# Patient Record
Sex: MALE | Race: WHITE | NOT HISPANIC OR LATINO | URBAN - METROPOLITAN AREA
[De-identification: names, ages, dates, MRNs, and addresses within clinical notes are randomized per-mention and may not be internally consistent; named-entity substitution may affect disease eponyms.]

---

## 2019-08-05 ENCOUNTER — INPATIENT (INPATIENT)
Facility: HOSPITAL | Age: 54
LOS: 3 days | Discharge: HOME | DRG: 378 | End: 2019-08-09
Attending: GENERAL PRACTICE | Admitting: GENERAL PRACTICE
Payer: MEDICAID

## 2019-08-05 VITALS
TEMPERATURE: 98 F | DIASTOLIC BLOOD PRESSURE: 103 MMHG | HEART RATE: 101 BPM | WEIGHT: 179.9 LBS | SYSTOLIC BLOOD PRESSURE: 178 MMHG | OXYGEN SATURATION: 99 % | RESPIRATION RATE: 20 BRPM | HEIGHT: 67 IN

## 2019-08-05 DIAGNOSIS — R73.9 HYPERGLYCEMIA, UNSPECIFIED: ICD-10-CM

## 2019-08-05 DIAGNOSIS — K92.0 HEMATEMESIS: ICD-10-CM

## 2019-08-05 DIAGNOSIS — K80.20 CALCULUS OF GALLBLADDER WITHOUT CHOLECYSTITIS WITHOUT OBSTRUCTION: ICD-10-CM

## 2019-08-05 DIAGNOSIS — F10.239 ALCOHOL DEPENDENCE WITH WITHDRAWAL, UNSPECIFIED: ICD-10-CM

## 2019-08-05 DIAGNOSIS — Z29.9 ENCOUNTER FOR PROPHYLACTIC MEASURES, UNSPECIFIED: ICD-10-CM

## 2019-08-05 LAB
ACETONE SERPL-MCNC: ABNORMAL
APPEARANCE UR: CLEAR — SIGNIFICANT CHANGE UP
APTT BLD: 28.4 SEC — SIGNIFICANT CHANGE UP (ref 27.5–36.3)
BASE EXCESS BLDV CALC-SCNC: 5.8 MMOL/L — HIGH (ref -2–2)
BASOPHILS # BLD AUTO: 0.04 K/UL — SIGNIFICANT CHANGE UP (ref 0–0.2)
BASOPHILS NFR BLD AUTO: 0.2 % — SIGNIFICANT CHANGE UP (ref 0–2)
BILIRUB UR-MCNC: NEGATIVE — SIGNIFICANT CHANGE UP
COLOR SPEC: YELLOW — SIGNIFICANT CHANGE UP
DIFF PNL FLD: ABNORMAL
EOSINOPHIL # BLD AUTO: 0.01 K/UL — SIGNIFICANT CHANGE UP (ref 0–0.5)
EOSINOPHIL NFR BLD AUTO: 0.1 % — SIGNIFICANT CHANGE UP (ref 0–6)
GLUCOSE BLDC GLUCOMTR-MCNC: 288 MG/DL — HIGH (ref 70–99)
GLUCOSE UR QL: 1000 MG/DL
HCO3 BLDV-SCNC: 27 MMOL/L — SIGNIFICANT CHANGE UP (ref 21–29)
HCT VFR BLD CALC: 41.1 % — SIGNIFICANT CHANGE UP (ref 39–50)
HCT VFR BLD CALC: 48.5 % — SIGNIFICANT CHANGE UP (ref 39–50)
HGB BLD-MCNC: 13.3 G/DL — SIGNIFICANT CHANGE UP (ref 13–17)
HGB BLD-MCNC: 16.2 G/DL — SIGNIFICANT CHANGE UP (ref 13–17)
HOROWITZ INDEX BLDV+IHG-RTO: 21 — SIGNIFICANT CHANGE UP
IMM GRANULOCYTES NFR BLD AUTO: 0.5 % — SIGNIFICANT CHANGE UP (ref 0–1.5)
INR BLD: 1.06 RATIO — SIGNIFICANT CHANGE UP (ref 0.88–1.16)
KETONES UR-MCNC: ABNORMAL
LACTATE SERPL-SCNC: 1.1 MMOL/L — SIGNIFICANT CHANGE UP (ref 0.7–2)
LACTATE SERPL-SCNC: 3 MMOL/L — HIGH (ref 0.7–2)
LEUKOCYTE ESTERASE UR-ACNC: NEGATIVE — SIGNIFICANT CHANGE UP
LYMPHOCYTES # BLD AUTO: 1.27 K/UL — SIGNIFICANT CHANGE UP (ref 1–3.3)
LYMPHOCYTES # BLD AUTO: 7.7 % — LOW (ref 13–44)
MAGNESIUM SERPL-MCNC: 2.1 MG/DL — SIGNIFICANT CHANGE UP (ref 1.6–2.6)
MCHC RBC-ENTMCNC: 29.7 PG — SIGNIFICANT CHANGE UP (ref 27–34)
MCHC RBC-ENTMCNC: 30.2 PG — SIGNIFICANT CHANGE UP (ref 27–34)
MCHC RBC-ENTMCNC: 32.4 GM/DL — SIGNIFICANT CHANGE UP (ref 32–36)
MCHC RBC-ENTMCNC: 33.4 GM/DL — SIGNIFICANT CHANGE UP (ref 32–36)
MCV RBC AUTO: 90.5 FL — SIGNIFICANT CHANGE UP (ref 80–100)
MCV RBC AUTO: 91.7 FL — SIGNIFICANT CHANGE UP (ref 80–100)
MONOCYTES # BLD AUTO: 0.5 K/UL — SIGNIFICANT CHANGE UP (ref 0–0.9)
MONOCYTES NFR BLD AUTO: 3 % — SIGNIFICANT CHANGE UP (ref 2–14)
NEUTROPHILS # BLD AUTO: 14.62 K/UL — HIGH (ref 1.8–7.4)
NEUTROPHILS NFR BLD AUTO: 88.5 % — HIGH (ref 43–77)
NITRITE UR-MCNC: NEGATIVE — SIGNIFICANT CHANGE UP
NRBC # BLD: 0 /100 WBCS — SIGNIFICANT CHANGE UP (ref 0–0)
NRBC # BLD: 0 /100 WBCS — SIGNIFICANT CHANGE UP (ref 0–0)
PCO2 BLDV: 31 MMHG — LOW (ref 35–50)
PH BLDV: 7.56 — HIGH (ref 7.35–7.45)
PH UR: 6 — SIGNIFICANT CHANGE UP (ref 5–8)
PLATELET # BLD AUTO: 180 K/UL — SIGNIFICANT CHANGE UP (ref 150–400)
PLATELET # BLD AUTO: 201 K/UL — SIGNIFICANT CHANGE UP (ref 150–400)
PO2 BLDV: 28 MMHG — SIGNIFICANT CHANGE UP (ref 25–45)
PROT UR-MCNC: 500 MG/DL
PROTHROM AB SERPL-ACNC: 11.8 SEC — SIGNIFICANT CHANGE UP (ref 10–12.9)
RBC # BLD: 4.48 M/UL — SIGNIFICANT CHANGE UP (ref 4.2–5.8)
RBC # BLD: 5.36 M/UL — SIGNIFICANT CHANGE UP (ref 4.2–5.8)
RBC # FLD: 12.8 % — SIGNIFICANT CHANGE UP (ref 10.3–14.5)
RBC # FLD: 12.8 % — SIGNIFICANT CHANGE UP (ref 10.3–14.5)
SAO2 % BLDV: 62 % — LOW (ref 67–88)
SP GR SPEC: 1.01 — SIGNIFICANT CHANGE UP (ref 1.01–1.02)
UROBILINOGEN FLD QL: NEGATIVE — SIGNIFICANT CHANGE UP
WBC # BLD: 14.17 K/UL — HIGH (ref 3.8–10.5)
WBC # BLD: 16.53 K/UL — HIGH (ref 3.8–10.5)
WBC # FLD AUTO: 14.17 K/UL — HIGH (ref 3.8–10.5)
WBC # FLD AUTO: 16.53 K/UL — HIGH (ref 3.8–10.5)

## 2019-08-05 PROCEDURE — 74174 CTA ABD&PLVS W/CONTRAST: CPT | Mod: 26

## 2019-08-05 PROCEDURE — 99285 EMERGENCY DEPT VISIT HI MDM: CPT

## 2019-08-05 RX ORDER — ONDANSETRON 8 MG/1
4 TABLET, FILM COATED ORAL ONCE
Refills: 0 | Status: COMPLETED | OUTPATIENT
Start: 2019-08-05 | End: 2019-08-05

## 2019-08-05 RX ORDER — INSULIN HUMAN 100 [IU]/ML
10 INJECTION, SOLUTION SUBCUTANEOUS ONCE
Refills: 0 | Status: COMPLETED | OUTPATIENT
Start: 2019-08-05 | End: 2019-08-05

## 2019-08-05 RX ORDER — ACETAMINOPHEN 500 MG
1000 TABLET ORAL ONCE
Refills: 0 | Status: COMPLETED | OUTPATIENT
Start: 2019-08-05 | End: 2019-08-05

## 2019-08-05 RX ORDER — INSULIN LISPRO 100/ML
VIAL (ML) SUBCUTANEOUS
Refills: 0 | Status: DISCONTINUED | OUTPATIENT
Start: 2019-08-05 | End: 2019-08-09

## 2019-08-05 RX ORDER — SODIUM CHLORIDE 9 MG/ML
1000 INJECTION, SOLUTION INTRAVENOUS
Refills: 0 | Status: DISCONTINUED | OUTPATIENT
Start: 2019-08-05 | End: 2019-08-06

## 2019-08-05 RX ORDER — AMLODIPINE BESYLATE 2.5 MG/1
5 TABLET ORAL ONCE
Refills: 0 | Status: COMPLETED | OUTPATIENT
Start: 2019-08-05 | End: 2019-08-05

## 2019-08-05 RX ORDER — THIAMINE MONONITRATE (VIT B1) 100 MG
100 TABLET ORAL ONCE
Refills: 0 | Status: COMPLETED | OUTPATIENT
Start: 2019-08-05 | End: 2019-08-05

## 2019-08-05 RX ORDER — FOLIC ACID 0.8 MG
1 TABLET ORAL DAILY
Refills: 0 | Status: DISCONTINUED | OUTPATIENT
Start: 2019-08-05 | End: 2019-08-09

## 2019-08-05 RX ORDER — INSULIN GLARGINE 100 [IU]/ML
20 INJECTION, SOLUTION SUBCUTANEOUS AT BEDTIME
Refills: 0 | Status: DISCONTINUED | OUTPATIENT
Start: 2019-08-05 | End: 2019-08-06

## 2019-08-05 RX ORDER — HYDROMORPHONE HYDROCHLORIDE 2 MG/ML
1 INJECTION INTRAMUSCULAR; INTRAVENOUS; SUBCUTANEOUS ONCE
Refills: 0 | Status: DISCONTINUED | OUTPATIENT
Start: 2019-08-05 | End: 2019-08-05

## 2019-08-05 RX ORDER — PANTOPRAZOLE SODIUM 20 MG/1
40 TABLET, DELAYED RELEASE ORAL ONCE
Refills: 0 | Status: COMPLETED | OUTPATIENT
Start: 2019-08-05 | End: 2019-08-05

## 2019-08-05 RX ORDER — THIAMINE MONONITRATE (VIT B1) 100 MG
100 TABLET ORAL DAILY
Refills: 0 | Status: COMPLETED | OUTPATIENT
Start: 2019-08-05 | End: 2019-08-08

## 2019-08-05 RX ORDER — ONDANSETRON 8 MG/1
4 TABLET, FILM COATED ORAL ONCE
Refills: 0 | Status: DISCONTINUED | OUTPATIENT
Start: 2019-08-05 | End: 2019-08-05

## 2019-08-05 RX ORDER — ONDANSETRON 8 MG/1
4 TABLET, FILM COATED ORAL EVERY 8 HOURS
Refills: 0 | Status: DISCONTINUED | OUTPATIENT
Start: 2019-08-05 | End: 2019-08-09

## 2019-08-05 RX ORDER — PANTOPRAZOLE SODIUM 20 MG/1
40 TABLET, DELAYED RELEASE ORAL EVERY 12 HOURS
Refills: 0 | Status: DISCONTINUED | OUTPATIENT
Start: 2019-08-05 | End: 2019-08-09

## 2019-08-05 RX ORDER — BENZOCAINE AND MENTHOL 5; 1 G/100ML; G/100ML
1 LIQUID ORAL THREE TIMES A DAY
Refills: 0 | Status: DISCONTINUED | OUTPATIENT
Start: 2019-08-05 | End: 2019-08-09

## 2019-08-05 RX ORDER — SODIUM CHLORIDE 9 MG/ML
1000 INJECTION INTRAMUSCULAR; INTRAVENOUS; SUBCUTANEOUS ONCE
Refills: 0 | Status: COMPLETED | OUTPATIENT
Start: 2019-08-05 | End: 2019-08-05

## 2019-08-05 RX ADMIN — BENZOCAINE AND MENTHOL 1 LOZENGE: 5; 1 LIQUID ORAL at 19:11

## 2019-08-05 RX ADMIN — BENZOCAINE AND MENTHOL 1 LOZENGE: 5; 1 LIQUID ORAL at 22:10

## 2019-08-05 RX ADMIN — Medication 1000 MILLIGRAM(S): at 19:08

## 2019-08-05 RX ADMIN — AMLODIPINE BESYLATE 5 MILLIGRAM(S): 2.5 TABLET ORAL at 19:10

## 2019-08-05 RX ADMIN — SODIUM CHLORIDE 1000 MILLILITER(S): 9 INJECTION INTRAMUSCULAR; INTRAVENOUS; SUBCUTANEOUS at 11:30

## 2019-08-05 RX ADMIN — HYDROMORPHONE HYDROCHLORIDE 1 MILLIGRAM(S): 2 INJECTION INTRAMUSCULAR; INTRAVENOUS; SUBCUTANEOUS at 08:12

## 2019-08-05 RX ADMIN — SODIUM CHLORIDE 1000 MILLILITER(S): 9 INJECTION INTRAMUSCULAR; INTRAVENOUS; SUBCUTANEOUS at 08:12

## 2019-08-05 RX ADMIN — SODIUM CHLORIDE 1000 MILLILITER(S): 9 INJECTION INTRAMUSCULAR; INTRAVENOUS; SUBCUTANEOUS at 10:13

## 2019-08-05 RX ADMIN — SODIUM CHLORIDE 100 MILLILITER(S): 9 INJECTION, SOLUTION INTRAVENOUS at 19:10

## 2019-08-05 RX ADMIN — Medication 100 MILLIGRAM(S): at 22:10

## 2019-08-05 RX ADMIN — ONDANSETRON 4 MILLIGRAM(S): 8 TABLET, FILM COATED ORAL at 23:55

## 2019-08-05 RX ADMIN — Medication 3: at 22:09

## 2019-08-05 RX ADMIN — Medication 30 MILLILITER(S): at 17:23

## 2019-08-05 RX ADMIN — Medication 2 MILLIGRAM(S): at 17:52

## 2019-08-05 RX ADMIN — ONDANSETRON 4 MILLIGRAM(S): 8 TABLET, FILM COATED ORAL at 17:52

## 2019-08-05 RX ADMIN — PANTOPRAZOLE SODIUM 40 MILLIGRAM(S): 20 TABLET, DELAYED RELEASE ORAL at 08:11

## 2019-08-05 RX ADMIN — Medication 400 MILLIGRAM(S): at 17:52

## 2019-08-05 RX ADMIN — ONDANSETRON 4 MILLIGRAM(S): 8 TABLET, FILM COATED ORAL at 08:11

## 2019-08-05 RX ADMIN — HYDROMORPHONE HYDROCHLORIDE 1 MILLIGRAM(S): 2 INJECTION INTRAMUSCULAR; INTRAVENOUS; SUBCUTANEOUS at 09:30

## 2019-08-05 RX ADMIN — INSULIN GLARGINE 20 UNIT(S): 100 INJECTION, SOLUTION SUBCUTANEOUS at 22:09

## 2019-08-05 NOTE — H&P ADULT - PROBLEM SELECTOR PLAN 4
pt has been drinking 1-2 drinks per day for last 2 months.  last drink was 3 days ago.  [place] monitoring

## 2019-08-05 NOTE — ED ADULT NURSE NOTE - ED STAT RN HANDOFF DETAILS 2
Pt endorsed to [facility] in no distress, awaiting transfer to bed on [address]. Report given to Susanna LIM by Aneta LIM.

## 2019-08-05 NOTE — ED PROVIDER NOTE - CARE PLAN
Principal Discharge DX:	Hematemesis  Secondary Diagnosis:	Hyperglycemia  Secondary Diagnosis:	Generalized abdominal pain  Secondary Diagnosis:	QT prolongation

## 2019-08-05 NOTE — H&P ADULT - PROBLEM SELECTOR PLAN 2
pt is found to have cholelithiases on CT abdomen.  Generalized abdominal tenderness on examination.  nausea and vomiting.  WBC count of 15K.  GI consult. pt is found to have cholelithiases on CT abdomen.  Generalized abdominal tenderness on examination.  nausea and vomiting.  WBC count of 15K.  GI consult. dr reno pt is found to have cholelithiases on CT abdomen.  Generalized abdominal tenderness on examination.  nausea and vomiting.  WBC count of 15K.  GI consult. dr reno  ? need for further testing / HIDA ?

## 2019-08-05 NOTE — H&P ADULT - NEUROLOGICAL DETAILS
Referred To Otolaryngology For Closure Text (Leave Blank If You Do Not Want): After obtaining clear surgical margins the patient was sent to otolaryngology for surgical repair.  The patient understands they will receive post-surgical care and follow-up from the referring physician's office. normal strength/alert and oriented x 3/sensation intact

## 2019-08-05 NOTE — ED PROVIDER NOTE - OBJECTIVE STATEMENT
I assisted with Georgian translation, declines formal translation.  53yoM with h/o DM on insulin, presents with generalized abdominal pain, severe, since Saturday, pt not able to characterize further 2/2 pain. Associated multiple episodes of emesis, marhilario noted here.  last BM was 2 days ago.  was at another hospital 2 days ago for the same, unable to tell me dx at that time.  has been drinking a lot of EtOH x 2 months. Denies past surgical hx or other PMHx.

## 2019-08-05 NOTE — H&P ADULT - NSHPREVIEWOFSYSTEMS_GEN_ALL_CORE
GEN: no fever, no chills, some epigastric pain  RESP: no SOB, no cough, no sputum  CVS: no chest pain, no palpitations, no edema  GI: some epigastric abdominal and esophageal pain, no nausea, no vomiting ( last one a few hours ago   : no dysuria, no frequency, no hematuria  NEURO: no headache, no dizziness  PSYCH: no depression, not anxious  Derm : no itching, no rash

## 2019-08-05 NOTE — H&P ADULT - ASSESSMENT
53 years old male from home with PMHx of diabetes (on insulin), alcoholism (1-2 drinks daily for last 2 months) presented to ED with complain of blood in vomitus. Patient is having severe generalized abdominal pain which is intermittent and associated with nausea and multiple episodes of vomiting. Patient had few episodes of streaks of blood along with vomiting (maroon color vomitus). He is regular alcohol drinker with his last drink about 3 days ago. patient also complains of throat dryness and pain along with acid reflux burning sensation which is relieved by taking Maalox Patient never had this kind of abdominal pain before. Patient denies any subjective fever, eating from outside or any body having similar symptoms at home. Patient is not on any blood thinner.  patient denies any chest pain, shortness of breath, palpitations, diarrhea, constipation, urinary frequency, relation of pain to food intake or any other symptom.

## 2019-08-05 NOTE — ED PROVIDER NOTE - CLINICAL SUMMARY MEDICAL DECISION MAKING FREE TEXT BOX
Character low suspicion for ACS and negative ECG. Abdominal exam benign on initial and repeat exams with low suspicion for acute process, and no e/o this on CT. Noted gallstones, no Evans's or LFT/bili elevation with low suspicion for this as cause of symptoms. No e/o mesenteric ischemia and low suspicion for this. No e/o SBO or pancreatitis. Pt with hyperglycemia without acidosis, ketones likely 2/2 dehydration. Given fluids, Dilaudid, Zofran with control of his symptoms however does not feel comfortable with discharge and has no established care in the [place]. Patient hemodynamically stable. Admitted to internal medicine for further monitoring, w/u, and care of concern for upper GI bleed with mild maroon hematemesis.

## 2019-08-05 NOTE — H&P ADULT - PROBLEM SELECTOR PLAN 3
pt presented with hyperglycemia. no anion gap but acetone was high.  pt has history of DM for last 11 years and takes variable number of lantus units based on his blood sugar levels. on average 24 units.  pt is started on lantus 20 units and insulin HSS  f/u hba1c pt presented with hyperglycemia / diabetic ketosis without acidosis      no anion gap but acetone was high.  pt has history of DM for last 11 years and takes variable number of lantus units based on his blood sugar levels. on average 24 units.  pt is started on lantus 20 units and insulin HSS  f/u hba1c

## 2019-08-05 NOTE — H&P ADULT - HISTORY OF PRESENT ILLNESS
53 years old male from home with PMHx of diabetes (on insulin), alcoholism (1-2 drinks daily for last 2 months) presented to ED with complain of blood in vomitus. Patient is having severe generalized abdominal pain which is intermittent and associated with nausea and multiple episodes of vomiting. Patient had few episodes of streaks of blood along with vomiting. He is regular alcohol drinker with his last drink about 3 days ago. patient also complains of throat dryness and pain along with acid reflux burning sensation which is relieved by taking Maalox Patient never had this kind of abdominal pain before. Patient denies any subjective fever, eating from outside or any body having similar symptoms at home. Patient is not on any blood thinner.  patient denies any chest pain, shortness of breath, palpitations, diarrhea, constipation, urinary frequency, relation of pain to food intake or any other symptom. 53 years old male from home with PMHx of diabetes (on insulin), alcoholism (1-2 drinks daily for last 2 months) presented to ED with complain of blood in vomitus. Patient is having severe generalized abdominal pain which is intermittent and associated with nausea and multiple episodes of vomiting. Patient had few episodes of streaks of blood along with vomiting.   He is regular alcohol drinker with his last drink about 3 days ago. patient also complains of throat dryness and pain along with acid reflux burning sensation which is relieved by taking Maalox Patient never had this kind of abdominal pain before. Patient denies any subjective fever, eating from outside or any body having similar symptoms at home. Patient is not on any blood thinner.  patient denies any chest pain, shortness of breath, palpitations, diarrhea, constipation, urinary frequency, relation of pain to food intake or any other symptom.

## 2019-08-05 NOTE — ED ADULT NURSE NOTE - NSIMPLEMENTINTERV_GEN_ALL_ED
Implemented All Fall with Harm Risk Interventions:  [place] to call system. Call bell, personal items and telephone within reach. Instruct patient to call for assistance. Room bathroom lighting operational. Non-slip footwear when patient is off stretcher. Physically safe environment: no spills, clutter or unnecessary equipment. Stretcher in lowest position, wheels locked, appropriate side rails in place. Provide visual cue, wrist band, yellow gown, etc. Monitor gait and stability. Monitor for mental status changes and reorient to person, place, and time. Review medications for side effects contributing to fall risk. Reinforce activity limits and safety measures with patient and family. Provide visual clues: red socks.

## 2019-08-05 NOTE — ED PROVIDER NOTE - PHYSICAL EXAMINATION
Afebrile, hemodynamically stable, saturating well  Moaning, anxious, maroon emesis in basin nearby  Head NCAT  EOMI grossly, anicteric  MM dry  RRR, nml S1/S2, no m/r/g  Lungs CTAB, no w/r/r  Abd soft, diffusely TTP with no rebound or guarding, ND  AAO, CN's 3-12 grossly intact  KEANE spontaneously, no leg cyanosis or edema  Skin warm, dry, no rashes or hives

## 2019-08-05 NOTE — H&P ADULT - NSHPPHYSICALEXAM_GEN_ALL_CORE
ICU Vital Signs Last 24 Hrs  T(C): 36.6 (05 Aug 2019 15:29), Max: 36.6 (05 Aug 2019 07:15)  T(F): 97.8 (05 Aug 2019 15:29), Max: 97.8 (05 Aug 2019 07:15)  HR: 96 (05 Aug 2019 15:29) (91 - 101)  BP: 154/62 (05 Aug 2019 15:29) (154/62 - 178/103)  BP(mean): --  ABP: --  ABP(mean): --  RR: 20 (05 Aug 2019 15:29) (20 - 20)  SpO2: 100% (05 Aug 2019 15:29) (99% - 100%)                                  Head NCAT  	EOMI grossly, anicteric  	MM dry  	RRR, nml S1/S2, no m/r/g  	Lungs CTAB, no w/r/r  	Abd soft, diffusely TTP with no rebound or guarding, ND  	AAO, CN's 3-12 grossly intact  	KEANE spontaneously, no leg cyanosis or edema                 Skin warm, dry, no rashes or hives

## 2019-08-06 LAB
ALBUMIN SERPL ELPH-MCNC: 2.6 G/DL — LOW (ref 3.5–5)
ALP SERPL-CCNC: 103 U/L — SIGNIFICANT CHANGE UP (ref 40–120)
ALT FLD-CCNC: 20 U/L DA — SIGNIFICANT CHANGE UP (ref 10–60)
ANION GAP SERPL CALC-SCNC: 9 MMOL/L — SIGNIFICANT CHANGE UP (ref 5–17)
AST SERPL-CCNC: 18 U/L — SIGNIFICANT CHANGE UP (ref 10–40)
BASOPHILS # BLD AUTO: 0.03 K/UL — SIGNIFICANT CHANGE UP (ref 0–0.2)
BASOPHILS NFR BLD AUTO: 0.3 % — SIGNIFICANT CHANGE UP (ref 0–2)
BILIRUB SERPL-MCNC: 0.5 MG/DL — SIGNIFICANT CHANGE UP (ref 0.2–1.2)
BUN SERPL-MCNC: 29 MG/DL — HIGH (ref 7–18)
CALCIUM SERPL-MCNC: 8.7 MG/DL — SIGNIFICANT CHANGE UP (ref 8.4–10.5)
CHLORIDE SERPL-SCNC: 109 MMOL/L — HIGH (ref 96–108)
CHOLEST SERPL-MCNC: 183 MG/DL — SIGNIFICANT CHANGE UP (ref 10–199)
CO2 SERPL-SCNC: 27 MMOL/L — SIGNIFICANT CHANGE UP (ref 22–31)
CREAT ?TM UR-MCNC: 99 MG/DL — SIGNIFICANT CHANGE UP
CREAT SERPL-MCNC: 1.25 MG/DL — SIGNIFICANT CHANGE UP (ref 0.5–1.3)
CREAT SERPL-MCNC: 1.29 MG/DL — SIGNIFICANT CHANGE UP (ref 0.5–1.3)
EOSINOPHIL # BLD AUTO: 0.07 K/UL — SIGNIFICANT CHANGE UP (ref 0–0.5)
EOSINOPHIL NFR BLD AUTO: 0.7 % — SIGNIFICANT CHANGE UP (ref 0–6)
GLUCOSE BLDC GLUCOMTR-MCNC: 177 MG/DL — HIGH (ref 70–99)
GLUCOSE BLDC GLUCOMTR-MCNC: 179 MG/DL — HIGH (ref 70–99)
GLUCOSE BLDC GLUCOMTR-MCNC: 198 MG/DL — HIGH (ref 70–99)
GLUCOSE BLDC GLUCOMTR-MCNC: 221 MG/DL — HIGH (ref 70–99)
GLUCOSE SERPL-MCNC: 226 MG/DL — HIGH (ref 70–99)
HBA1C BLD-MCNC: 9.5 % — HIGH (ref 4–5.6)
HCT VFR BLD CALC: 43.4 % — SIGNIFICANT CHANGE UP (ref 39–50)
HDLC SERPL-MCNC: 63 MG/DL — SIGNIFICANT CHANGE UP
HGB BLD-MCNC: 14.3 G/DL — SIGNIFICANT CHANGE UP (ref 13–17)
IMM GRANULOCYTES NFR BLD AUTO: 0.3 % — SIGNIFICANT CHANGE UP (ref 0–1.5)
LIPID PNL WITH DIRECT LDL SERPL: 100 MG/DL — SIGNIFICANT CHANGE UP
LYMPHOCYTES # BLD AUTO: 1.79 K/UL — SIGNIFICANT CHANGE UP (ref 1–3.3)
LYMPHOCYTES # BLD AUTO: 17.5 % — SIGNIFICANT CHANGE UP (ref 13–44)
MAGNESIUM SERPL-MCNC: 2.2 MG/DL — SIGNIFICANT CHANGE UP (ref 1.6–2.6)
MCHC RBC-ENTMCNC: 29.8 PG — SIGNIFICANT CHANGE UP (ref 27–34)
MCHC RBC-ENTMCNC: 32.9 GM/DL — SIGNIFICANT CHANGE UP (ref 32–36)
MCV RBC AUTO: 90.4 FL — SIGNIFICANT CHANGE UP (ref 80–100)
MONOCYTES # BLD AUTO: 0.63 K/UL — SIGNIFICANT CHANGE UP (ref 0–0.9)
MONOCYTES NFR BLD AUTO: 6.2 % — SIGNIFICANT CHANGE UP (ref 2–14)
NEUTROPHILS # BLD AUTO: 7.66 K/UL — HIGH (ref 1.8–7.4)
NEUTROPHILS NFR BLD AUTO: 75 % — SIGNIFICANT CHANGE UP (ref 43–77)
NRBC # BLD: 0 /100 WBCS — SIGNIFICANT CHANGE UP (ref 0–0)
OSMOLALITY SERPL: 318 MOSMOL/KG — HIGH (ref 275–300)
OSMOLALITY UR: 780 MOS/KG — SIGNIFICANT CHANGE UP (ref 50–1200)
PHOSPHATE SERPL-MCNC: 2 MG/DL — LOW (ref 2.5–4.5)
PLATELET # BLD AUTO: 200 K/UL — SIGNIFICANT CHANGE UP (ref 150–400)
POTASSIUM SERPL-MCNC: 3.6 MMOL/L — SIGNIFICANT CHANGE UP (ref 3.5–5.3)
POTASSIUM SERPL-SCNC: 3.6 MMOL/L — SIGNIFICANT CHANGE UP (ref 3.5–5.3)
PROT SERPL-MCNC: 7 G/DL — SIGNIFICANT CHANGE UP (ref 6–8.3)
RBC # BLD: 4.8 M/UL — SIGNIFICANT CHANGE UP (ref 4.2–5.8)
RBC # FLD: 12.8 % — SIGNIFICANT CHANGE UP (ref 10.3–14.5)
SODIUM SERPL-SCNC: 145 MMOL/L — SIGNIFICANT CHANGE UP (ref 135–145)
SODIUM UR-SCNC: 88 MMOL/L — SIGNIFICANT CHANGE UP (ref 40–220)
TOTAL CHOLESTEROL/HDL RATIO MEASUREMENT: 2.9 RATIO — LOW (ref 3.4–9.6)
TRIGL SERPL-MCNC: 100 MG/DL — SIGNIFICANT CHANGE UP (ref 10–149)
TSH SERPL-MCNC: 1.64 UU/ML — SIGNIFICANT CHANGE UP (ref 0.34–4.82)
VIT B12 SERPL-MCNC: 929 PG/ML — SIGNIFICANT CHANGE UP (ref 232–1245)
WBC # BLD: 10.21 K/UL — SIGNIFICANT CHANGE UP (ref 3.8–10.5)
WBC # FLD AUTO: 10.21 K/UL — SIGNIFICANT CHANGE UP (ref 3.8–10.5)

## 2019-08-06 RX ORDER — SODIUM CHLORIDE 9 MG/ML
1000 INJECTION, SOLUTION INTRAVENOUS
Refills: 0 | Status: DISCONTINUED | OUTPATIENT
Start: 2019-08-06 | End: 2019-08-08

## 2019-08-06 RX ORDER — INSULIN GLARGINE 100 [IU]/ML
24 INJECTION, SOLUTION SUBCUTANEOUS AT BEDTIME
Refills: 0 | Status: DISCONTINUED | OUTPATIENT
Start: 2019-08-06 | End: 2019-08-09

## 2019-08-06 RX ADMIN — INSULIN GLARGINE 24 UNIT(S): 100 INJECTION, SOLUTION SUBCUTANEOUS at 22:31

## 2019-08-06 RX ADMIN — Medication 1: at 22:31

## 2019-08-06 RX ADMIN — Medication 2: at 08:57

## 2019-08-06 RX ADMIN — BENZOCAINE AND MENTHOL 1 LOZENGE: 5; 1 LIQUID ORAL at 22:32

## 2019-08-06 RX ADMIN — ONDANSETRON 4 MILLIGRAM(S): 8 TABLET, FILM COATED ORAL at 05:26

## 2019-08-06 RX ADMIN — PANTOPRAZOLE SODIUM 40 MILLIGRAM(S): 20 TABLET, DELAYED RELEASE ORAL at 05:26

## 2019-08-06 RX ADMIN — SODIUM CHLORIDE 100 MILLILITER(S): 9 INJECTION, SOLUTION INTRAVENOUS at 06:23

## 2019-08-06 RX ADMIN — PANTOPRAZOLE SODIUM 40 MILLIGRAM(S): 20 TABLET, DELAYED RELEASE ORAL at 17:48

## 2019-08-06 RX ADMIN — BENZOCAINE AND MENTHOL 1 LOZENGE: 5; 1 LIQUID ORAL at 05:27

## 2019-08-06 RX ADMIN — ONDANSETRON 4 MILLIGRAM(S): 8 TABLET, FILM COATED ORAL at 17:45

## 2019-08-06 NOTE — PROGRESS NOTE ADULT - ASSESSMENT
_________________________________________________________________________________________  ========>>  M E D I C A L   A T T E N D I N G    F O L L O W  U P  N O T E  <<=========  -----------------------------------------------------------------------------------------------------    - Patient seen and examined by me approximately thirty minutes ago.  - In summary,  WU FARIAS is a 53y year old man who originally presented with abd pain, vomiting, ETOH   - Patient today overall doing ok, comfortable, still with pain in throat and abd, difficulty swallowing saliva     ==================>> REVIEW OF SYSTEM <<=================    GEN: no fever, no chills, pain as above   RESP: no SOB, no cough, no sputum  CVS: no chest pain, no palpitations, no edema  GI: + abdominal pain, sore throat , no nausea  : no dysuria, no frequency, no hematuria  Neuro: no headache, no dizziness  Derm : no itching, no rash    ==================>> PHYSICAL EXAM <<=================    GEN: A&O X 3 , NAD , comfortable in bed   HEENT: NCAT, PERRL, MMM, hearing intact  Neck: supple , no JVD  CVS: S1S2 , regular , No M/R/G appreciated  PULM: CTA B/L,  no W/R/R appreciated  ABD.: soft. non tender, non distended,  bowel sounds present  Extrem: intact pulses , no edema   PSYCH : normal mood,  not anxious      ==================>> MEDICATIONS <<====================    MEDICATIONS  (STANDING):  benzocaine 15 mG/menthol 3.6 mG (Sugar-Free) Lozenge 1 Lozenge Oral three times a day  folic acid 1 milliGRAM(s) Oral daily  insulin glargine Injectable (LANTUS) 24 Unit(s) SubCutaneous at bedtime  insulin lispro (HumaLOG) corrective regimen sliding scale   SubCutaneous Before meals and at bedtime  multivitamin 1 Tablet(s) Oral daily  ondansetron Injectable 4 milliGRAM(s) IV Push every 8 hours  pantoprazole  Injectable 40 milliGRAM(s) IV Push every 12 hours  sodium chloride 0.45% 1000 milliLiter(s) (100 mL/Hr) IV Continuous <Continuous>  thiamine 100 milliGRAM(s) Oral daily    MEDICATIONS  (PRN):  aluminum hydroxide/magnesium hydroxide/simethicone Suspension 30 milliLiter(s) Oral every 6 hours PRN Dyspepsia  aluminum hydroxide/magnesium hydroxide/simethicone Suspension 30 milliLiter(s) Oral every 6 hours PRN Dyspepsia  LORazepam   Injectable 2 milliGRAM(s) IV Push every 4 hours PRN CIWA-Ar score 8 or greater      ==================>> VITAL SIGNS <<==================    T(C): 37.1 (19 @ 10:55), Max: 37.3 (19 @ 19:35)  HR: 95 (19 @ 10:55) (86 - 98)  BP: 171/82 (19 @ 10:55) (154/62 - 171/82)  RR: 17 (19 @ 10:55) (17 - 20)  SpO2: 96% (19 @ 10:55) (95% - 100%)    POCT Blood Glucose.: 198 mg/dL (06 Aug 2019 12:06)  POCT Blood Glucose.: 221 mg/dL (06 Aug 2019 08:01)  POCT Blood Glucose.: 288 mg/dL (05 Aug 2019 21:14)       ==================>> LAB AND IMAGING <<==================                        14.3   10.21 )-----------( 200      ( 06 Aug 2019 06:16 )             43.4          x   |  x   |  x   ----------------------------<  x   x    |  x   |  1.25    Ca    8.7      06 Aug 2019 06:16  Phos  2.0     08-  Mg     2.2     08-06    TPro  7.0  /  Alb  2.6<L>  /  TBili  0.5  /  DBili  x   /  AST  18  /  ALT  20  /  AlkPhos  103  08-06    PT/INR - ( 05 Aug 2019 08:52 )   PT: 11.8 sec;   INR: 1.06 ratio    PTT - ( 05 Aug 2019 08:52 )  PTT:28.4 sec               Urinalysis Basic - ( 05 Aug 2019 14:16 )  Color: Yellow / Appearance: Clear / S.015 / pH: x  Gluc: x / Ketone: Large  / Bili: Negative / Urobili: Negative   Blood: x / Protein: 500 mg/dL / Nitrite: Negative   Leuk Esterase: Negative / RBC: 2-5 /HPF / WBC 0-2 /HPF   Sq Epi: x / Non Sq Epi: Occasional /HPF / Bacteria: x    TSH:      1.64   (19)           Lipid profile:  (19)     Total: 183     LDL  : 100     HDL  :63     TG   :100    ___________________________________________________________________________________  ===============>>  A S S E S S M E N T   A N D   P L A N <<===============  ------------------------------------------------------------------------------------------    · Assessment		  53 years old male from home with PMHx of diabetes (on insulin), alcoholism (1-2 drinks daily for last 2 months) presented to ED with complain of blood in vomitus.       Problem/Plan - 1:  ·  Problem: Hematemesis.       pt presented with 1-2 episodes of hematemesis/ maroon color blood with vomiting.  Hemoglobin:   14.3 <<==,  13.3 <<==,  16.2 <<==  IV protonix BID.  GI appreciated : need EGD   close hemodynamic monitoring.  monitor for further episodes.  antiemetics as needed     Problem/Plan - 2:  ·  Problem: Cholelithiases.        pt is found to have cholelithiases on CT abdomen.  Generalized abdominal tenderness on examination.  nausea and vomiting.  WBC count of 15K.  GI f/u  ? need for further testing / HIDA ?     Problem/Plan - 3:  ·  Problem: Hyperglycemia     pt presented with hyperglycemia / diabetic ketosis without acidosis     pt has history of DM for last 11 years and takes variable number of lantus units based on his blood sugar levels. on average 24 units.  pt is started on lantus 20 units and insulin HSS  f/u hba1c.   for now better : endo as needed     Problem/Plan - 4:  ·  Problem: Alcohol withdrawal  pt has been drinking 1-2 drinks per day for last 2 months.  last drink was 3 days ago.  CIWA monitoring.     -GI/DVT Prophylaxis.    --------------------------------------------  Case discussed with pt, HS  Education given on findings and plan of care  ___________________________  H. DONOVAN [place].  Pager: [phone]

## 2019-08-06 NOTE — PROGRESS NOTE ADULT - PROBLEM SELECTOR PLAN 2
pt is found to have cholelithiases on CT abdomen.  Generalized abdominal tenderness on examination.  nausea and vomiting.  WBC count of 15K.  GI consult. dr reno  ? need for further testing / HIDA ? pt is found to have cholelithiases on CT abdomen.  Generalized abdominal tenderness on examination.  nausea and vomiting.  WBC count of 15K.  GI consult. dr reno  may need for further testing / HIDA  GI recommended F/U US abdomen

## 2019-08-06 NOTE — PROGRESS NOTE ADULT - PROBLEM SELECTOR PLAN 3
pt presented with hyperglycemia / diabetic ketosis without acidosis      no anion gap but acetone was high.  pt has history of DM for last 11 years and takes variable number of lantus units based on his blood sugar levels. on average 24 units.  pt is started on lantus 20 units and insulin HSS  f/u hba1c pt presented with hyperglycemia / diabetic ketosis without acidosis  no anion gap but acetone was high.  pt has history of DM for last 11 years and takes variable number of lantus units based on his blood sugar levels. on average 24 units.  pt is started on lantus 20 units and insulin HSS  f/u hba1c

## 2019-08-06 NOTE — PROGRESS NOTE ADULT - PROBLEM SELECTOR PLAN 4
pt has been drinking 1-2 drinks per day for last 2 months.  last drink was 3 days ago.  [place] monitoring pt has been drinking 1-2 drinks per day for last 2 months.  last drink was 3 days ago.  CIWA monitoring  ativan PRN

## 2019-08-06 NOTE — CONSULT NOTE ADULT - ASSESSMENT
53 year old male with nausea and episodes of blood streaked vomiting     Plan:  Hematemesis  The patient was in transit for EGD however decided to have solid food despite my multiple conversations with him that he could eat after the EGD.   For now Advance to clear liquid diet   NPO post midnight for EGD tomorrow morning     Abdominal pain   Ct angio showing no acute pathology   Obtain RUQ sono     ETOH :  Monitor for withdrawal

## 2019-08-06 NOTE — PROGRESS NOTE ADULT - SUBJECTIVE AND OBJECTIVE BOX
PGY 1 Note discussed with supervising resident and primary attending    Patient is a 53y old  Male who presents with a chief complaint of abdominal pain nausea vomiting and hematemesis (05 Aug 2019 18:19)      INTERVAL HPI/OVERNIGHT EVENTS: had nausea and vomiting. now has NBNB vomit. CIWA between 1-7 overnight, received 1 dose of ativan     MEDICATIONS  (STANDING):  benzocaine 15 mG/menthol 3.6 mG (Sugar-Free) Lozenge 1 Lozenge Oral three times a day  folic acid 1 milliGRAM(s) Oral daily  insulin glargine Injectable (LANTUS) 24 Unit(s) SubCutaneous at bedtime  insulin lispro (HumaLOG) corrective regimen sliding scale   SubCutaneous Before meals and at bedtime  multivitamin 1 Tablet(s) Oral daily  ondansetron Injectable 4 milliGRAM(s) IV Push every 8 hours  pantoprazole  Injectable 40 milliGRAM(s) IV Push every 12 hours  sodium chloride 0.45% 1000 milliLiter(s) (100 mL/Hr) IV Continuous <Continuous>  thiamine 100 milliGRAM(s) Oral daily    MEDICATIONS  (PRN):  aluminum hydroxide/magnesium hydroxide/simethicone Suspension 30 milliLiter(s) Oral every 6 hours PRN Dyspepsia  LORazepam   Injectable 2 milliGRAM(s) IV Push every 4 hours PRN CIWA-Ar score 8 or greater      __________________________________________________  REVIEW OF SYSTEMS:    CONSTITUTIONAL: No fever, no headache, + nausea and vomiting, dry throat  EYES: no acute visual disturbances  NECK: No pain or stiffness  RESPIRATORY: No cough; No shortness of breath  CARDIOVASCULAR: No chest pain, no palpitations  GASTROINTESTINAL: epigastric pain, nonradiating 6/10,  NEUROLOGICAL: No headache or numbness, mild tremors  MUSCULOSKELETAL: No joint pain, no muscle pain  GENITOURINARY: no dysuria, no frequency, no hesitancy  PSYCHIATRY: no depression , no anxiety  ALL OTHER  ROS negative        Vital Signs Last 24 Hrs  T(C): 36.8 (06 Aug 2019 05:18), Max: 37.3 (05 Aug 2019 19:35)  T(F): 98.3 (06 Aug 2019 05:18), Max: 99.2 (05 Aug 2019 19:35)  HR: 97 (06 Aug 2019 05:18) (86 - 98)  BP: 164/5 (06 Aug 2019 05:18) (154/62 - 164/5)  BP(mean): 79 (06 Aug 2019 05:18) (79 - 79)  RR: 18 (06 Aug 2019 05:18) (18 - 20)  SpO2: 95% (06 Aug 2019 05:18) (95% - 100%)    ________________________________________________  PHYSICAL EXAM:  GENERAL: NAD  HEENT: Normocephalic;  conjunctivae and sclerae clear; moist mucous membranes;   NECK : supple  CHEST/LUNG: Clear to auscultation bilaterally with good air entry   HEART: S1 S2  regular; no murmurs, gallops or rubs  ABDOMEN: Soft, mildly tender, Nondistended; Bowel sounds present  EXTREMITIES: no cyanosis; no edema; no calf tenderness  SKIN: warm and dry; no rash  NERVOUS SYSTEM:  Awake and alert; Oriented  to place, person and time ; no new deficits    _________________________________________________  LABS:                        14.3   10.21 )-----------( 200      ( 06 Aug 2019 06:16 )             43.4     08-06    145  |  109<H>  |  29<H>  ----------------------------<  226<H>  3.6   |  27  |  1.29    Ca    8.7      06 Aug 2019 06:16  Phos  2.0     08-06  Mg     2.2     08-06    TPro  7.0  /  Alb  2.6<L>  /  TBili  0.5  /  DBili  x   /  AST  18  /  ALT  20  /  AlkPhos  103  08-06    PT/INR - ( 05 Aug 2019 08:52 )   PT: 11.8 sec;   INR: 1.06 ratio         PTT - ( 05 Aug 2019 08:52 )  PTT:28.4 sec  Urinalysis Basic - ( 05 Aug 2019 14:16 )    Color: Yellow / Appearance: Clear / S.015 / pH: x  Gluc: x / Ketone: Large  / Bili: Negative / Urobili: Negative   Blood: x / Protein: 500 mg/dL / Nitrite: Negative   Leuk Esterase: Negative / RBC: 2-5 /HPF / WBC 0-2 /HPF   Sq Epi: x / Non Sq Epi: Occasional /HPF / Bacteria: x      CAPILLARY BLOOD GLUCOSE      POCT Blood Glucose.: 221 mg/dL (06 Aug 2019 08:01)  POCT Blood Glucose.: 288 mg/dL (05 Aug 2019 21:14)  POCT Blood Glucose.: 251 mg/dL (05 Aug 2019 13:31)        RADIOLOGY & ADDITIONAL TESTS:  < from: CT Angio Abdomen and Pelvis w/ IV Cont (19 @ 13:09) >    Impression: Bilateral pulmonary infiltrates. Follow-up chest CT may be   pursued in 4-6 weeks to ensure resolution. Attention should be directed   to the nonspecific lung nodules bilaterally on the follow-up chest CT to   ensure stability or resolution.    Apparent mural thickening of the distal esophagus. EGD may be pursued for   further evaluation. No evidence for active contrast extravasation in the   bowel lumen to suggest an active GI bleed. No bowel obstruction.    Apparent sludge and gallstone in the gallbladder.     Mildly enlarged 1.5 cm lymph node at the gastrohepatic ligament.      < end of copied text >      Imaging Personally Reviewed:  YES    Consultant(s) Notes Reviewed:   YES  Care Discussed with Consultants :     Plan of care was discussed with patient and /or primary care giver; all questions and concerns were addressed and care was aligned with patient's wishes. PGY 1 Note discussed with supervising resident and primary attending    Patient is a 53y old  Male who presents with a chief complaint of abdominal pain nausea vomiting and hematemesis (05 Aug 2019 18:19)      INTERVAL HPI/OVERNIGHT EVENTS: had nausea and vomiting. now has NBNB vomit. CIWA between 1-7 overnight, received 1 dose of ativan. Pt still has pain in the abdomen and throat    MEDICATIONS  (STANDING):  benzocaine 15 mG/menthol 3.6 mG (Sugar-Free) Lozenge 1 Lozenge Oral three times a day  folic acid 1 milliGRAM(s) Oral daily  insulin glargine Injectable (LANTUS) 24 Unit(s) SubCutaneous at bedtime  insulin lispro (HumaLOG) corrective regimen sliding scale   SubCutaneous Before meals and at bedtime  multivitamin 1 Tablet(s) Oral daily  ondansetron Injectable 4 milliGRAM(s) IV Push every 8 hours  pantoprazole  Injectable 40 milliGRAM(s) IV Push every 12 hours  sodium chloride 0.45% 1000 milliLiter(s) (100 mL/Hr) IV Continuous <Continuous>  thiamine 100 milliGRAM(s) Oral daily    MEDICATIONS  (PRN):  aluminum hydroxide/magnesium hydroxide/simethicone Suspension 30 milliLiter(s) Oral every 6 hours PRN Dyspepsia  LORazepam   Injectable 2 milliGRAM(s) IV Push every 4 hours PRN CIWA-Ar score 8 or greater      __________________________________________________  REVIEW OF SYSTEMS:    CONSTITUTIONAL: No fever, no headache, + nausea and vomiting, dry throat  EYES: no acute visual disturbances  NECK: No pain or stiffness  RESPIRATORY: No cough; No shortness of breath  CARDIOVASCULAR: No chest pain, no palpitations  GASTROINTESTINAL: epigastric pain, nonradiating 6/10,  NEUROLOGICAL: No headache or numbness, mild tremors  MUSCULOSKELETAL: No joint pain, no muscle pain  GENITOURINARY: no dysuria, no frequency, no hesitancy  PSYCHIATRY: no depression , no anxiety  ALL OTHER  ROS negative        Vital Signs Last 24 Hrs  T(C): 36.8 (06 Aug 2019 05:18), Max: 37.3 (05 Aug 2019 19:35)  T(F): 98.3 (06 Aug 2019 05:18), Max: 99.2 (05 Aug 2019 19:35)  HR: 97 (06 Aug 2019 05:18) (86 - 98)  BP: 164/5 (06 Aug 2019 05:18) (154/62 - 164/5)  BP(mean): 79 (06 Aug 2019 05:18) (79 - 79)  RR: 18 (06 Aug 2019 05:18) (18 - 20)  SpO2: 95% (06 Aug 2019 05:18) (95% - 100%)    ________________________________________________  PHYSICAL EXAM:  GENERAL: NAD  HEENT: Normocephalic;  conjunctivae and sclerae clear; moist mucous membranes;   NECK : supple  CHEST/LUNG: Clear to auscultation bilaterally with good air entry   HEART: S1 S2  regular; no murmurs, gallops or rubs  ABDOMEN: Soft, mildly tender, Nondistended; Bowel sounds present  EXTREMITIES: no cyanosis; no edema; no calf tenderness  SKIN: warm and dry; no rash  NERVOUS SYSTEM:  Awake and alert; Oriented  to place, person and time ; no new deficits    _________________________________________________  LABS:                        14.3   10.21 )-----------( 200      ( 06 Aug 2019 06:16 )             43.4     08-06    145  |  109<H>  |  29<H>  ----------------------------<  226<H>  3.6   |  27  |  1.29    Ca    8.7      06 Aug 2019 06:16  Phos  2.0     08-06  Mg     2.2     08-06    TPro  7.0  /  Alb  2.6<L>  /  TBili  0.5  /  DBili  x   /  AST  18  /  ALT  20  /  AlkPhos  103  08-06    PT/INR - ( 05 Aug 2019 08:52 )   PT: 11.8 sec;   INR: 1.06 ratio         PTT - ( 05 Aug 2019 08:52 )  PTT:28.4 sec  Urinalysis Basic - ( 05 Aug 2019 14:16 )    Color: Yellow / Appearance: Clear / S.015 / pH: x  Gluc: x / Ketone: Large  / Bili: Negative / Urobili: Negative   Blood: x / Protein: 500 mg/dL / Nitrite: Negative   Leuk Esterase: Negative / RBC: 2-5 /HPF / WBC 0-2 /HPF   Sq Epi: x / Non Sq Epi: Occasional /HPF / Bacteria: x      CAPILLARY BLOOD GLUCOSE      POCT Blood Glucose.: 221 mg/dL (06 Aug 2019 08:01)  POCT Blood Glucose.: 288 mg/dL (05 Aug 2019 21:14)  POCT Blood Glucose.: 251 mg/dL (05 Aug 2019 13:31)        RADIOLOGY & ADDITIONAL TESTS:  < from: CT Angio Abdomen and Pelvis w/ IV Cont (19 @ 13:09) >    Impression: Bilateral pulmonary infiltrates. Follow-up chest CT may be   pursued in 4-6 weeks to ensure resolution. Attention should be directed   to the nonspecific lung nodules bilaterally on the follow-up chest CT to   ensure stability or resolution.    Apparent mural thickening of the distal esophagus. EGD may be pursued for   further evaluation. No evidence for active contrast extravasation in the   bowel lumen to suggest an active GI bleed. No bowel obstruction.    Apparent sludge and gallstone in the gallbladder.     Mildly enlarged 1.5 cm lymph node at the gastrohepatic ligament.      < end of copied text >      Imaging Personally Reviewed:  YES    Consultant(s) Notes Reviewed:   YES  Care Discussed with Consultants :     Plan of care was discussed with patient and /or primary care giver; all questions and concerns were addressed and care was aligned with patient's wishes.

## 2019-08-06 NOTE — PROGRESS NOTE ADULT - ASSESSMENT
53 years old male from home with PMHx of diabetes (on insulin), alcoholism (1-2 drinks daily for last 2 months) presented to ED with complain of blood in vomitus. Patient is having severe generalized abdominal pain which is intermittent and associated with nausea and multiple episodes of vomiting. His last drink about 3 days ago. 53 years old male from home with PMHx of diabetes (on insulin), alcoholism (1-2 drinks daily for last 2 months) presented to ED with complain of blood in vomitus. Patient is having severe generalized abdominal pain which is intermittent and associated with nausea and multiple episodes of vomiting. His last drink about 3 days ago. Pt was admitted for further management of hematemesis and pain

## 2019-08-06 NOTE — PROGRESS NOTE ADULT - PROBLEM SELECTOR PLAN 1
pt presented with 1-2 episodes of hematemesis/ maroon color blood with vomiting.  patient has been having nausea and vomiting likely that has caused blood in vomitus.  H and H is stable at 16 on presentation.  Type and cross match ordered.  IV protonix BID.  GI consulted.  close hemodynamic monitoring.  monitor for further episodes.  IV zofran standing orer q 8 hourly.  Strarting on clear liquid diet. pt presented with 1-2 episodes of hematemesis/ maroon color blood with vomiting.  patient has been having nausea and vomiting now clear   H and H is 14.3 <<==,  13.3 <<==,  16.2 <<=  Type and cross match ordered.  IV protonix BID.  close hemodynamic monitoring.  monitor for further episodes.  IV zofran standing PRN as needed.  Starting on clear liquid diet.  GI Dr. Monroe consulted and pt is to be NPO midnight for EGD tomorrow

## 2019-08-06 NOTE — CONSULT NOTE ADULT - SUBJECTIVE AND OBJECTIVE BOX
Patient is a 53y old  Male who presents with a chief complaint of abdominal pain nausea vomiting and hematemesis (06 Aug 2019 11:17)    53 years old male with a  PMHx of diabetes (on insulin), alcoholism (1-2 drinks daily for last 2 months) presented to ED with complain of blood in vomitus. Patient is having severe generalized abdominal pain which is intermittent and associated with nausea and multiple episodes of vomiting. Patient had few episodes of streaks of blood along with vomiting.  He also complains of diffuse abdominal discomfort, crampy in nature with no alleviating factors.         REVIEW OF SYSTEMS  Constitutional:   No fever, no fatigue, no pallor, no night sweats, no weight loss.  HEENT:   No eye pain, no vision changes, no icterus, no mouth ulcers.  Respiratory:   No shortness of breath, no cough, no respiratory distress.   Cardiovascular:   No chest pain, no palpitations.   Gastrointestinal: + abdominal pain, + nausea, + vomiting , no diarrhea no constipation, no hematochezia, no melena.  Skin:   No rashes, no jaundice, no eczema.   Musculoskeletal:   No joint pain, no swelling, no myalgia.   Neurologic:   No headache, no seizure, no weakness.   Genitourinary:   No dysuria, no decreased urine output.  Psychiatric:  No depression, no anxiety,   Endocrine:   No thyroid disease, no diabetes.  Heme/Lymphatic:   No anemia, no blood transfusions, no lymph node enlargement, no bleeding, no bruising.  ___________________________________________________________________________________________  Allergies    No Known Allergies    Intolerances      MEDICATIONS  (STANDING):  benzocaine 15 mG/menthol 3.6 mG (Sugar-Free) Lozenge 1 Lozenge Oral three times a day  folic acid 1 milliGRAM(s) Oral daily  insulin glargine Injectable (LANTUS) 24 Unit(s) SubCutaneous at bedtime  insulin lispro (HumaLOG) corrective regimen sliding scale   SubCutaneous Before meals and at bedtime  multivitamin 1 Tablet(s) Oral daily  ondansetron Injectable 4 milliGRAM(s) IV Push every 8 hours  pantoprazole  Injectable 40 milliGRAM(s) IV Push every 12 hours  sodium chloride 0.45% 1000 milliLiter(s) (100 mL/Hr) IV Continuous <Continuous>  thiamine 100 milliGRAM(s) Oral daily    MEDICATIONS  (PRN):  aluminum hydroxide/magnesium hydroxide/simethicone Suspension 30 milliLiter(s) Oral every 6 hours PRN Dyspepsia  aluminum hydroxide/magnesium hydroxide/simethicone Suspension 30 milliLiter(s) Oral every 6 hours PRN Dyspepsia  LORazepam   Injectable 2 milliGRAM(s) IV Push every 4 hours PRN CIWA-Ar score 8 or greater      PAST MEDICAL & SURGICAL HISTORY:  Diabetes    FAMILY HISTORY:    Social History: No history of : Tobacco use, IVDA, EToH  ______________________________________________________________________________________    PHYSICAL EXAM    Daily     Daily Weight in k.9 (06 Aug 2019 05:18)  BMI: 28.2 ( @ 07:15)  Change in Weight:  Vital Signs Last 24 Hrs  T(C): 37.1 (06 Aug 2019 10:55), Max: 37.3 (05 Aug 2019 19:35)  T(F): 98.7 (06 Aug 2019 10:55), Max: 99.2 (05 Aug 2019 19:35)  HR: 95 (06 Aug 2019 10:55) (86 - 98)  BP: 171/82 (06 Aug 2019 10:55) (154/62 - 171/82)  BP(mean): 79 (06 Aug 2019 05:18) (79 - 79)  RR: 17 (06 Aug 2019 10:55) (17 - 20)  SpO2: 96% (06 Aug 2019 10:55) (95% - 100%)    General:  Well developed, well nourished, alert and active, no pallor, NAD.  HEENT:    Normal appearance of conjunctiva, ears, nose, lips, oropharynx, and oral mucosa, anicteric.  Neck:  No masses, no asymmetry.  Lymph Nodes:  No lymphadenopathy.   Cardiovascular:  RRR normal S1/S2, no murmur.  Respiratory:  CTA B/L, normal respiratory effort.   Abdominal:   soft, no masses or tenderness, normoactive BS, NT/ND, no HSM.  Extremities:   No clubbing or cyanosis, normal capillary refill, no edema.   Skin:   No rash, jaundice, lesions, eczema.     _______________________________________________________________________________________________  Lab Results:                          14.3   10.21 )-----------( 200      ( 06 Aug 2019 06:16 )             43.4     08-    x   |  x   |  x   ----------------------------<  x   x    |  x   |  1.25    Ca    8.7      06 Aug 2019 06:16  Phos  2.0     08-  Mg     2.2     08-06    TPro  7.0  /  Alb  2.6<L>  /  TBili  0.5  /  DBili  x   /  AST  18  /  ALT  20  /  AlkPhos  103  08-06    LIVER FUNCTIONS - ( 06 Aug 2019 06:16 )  Alb: 2.6 g/dL / Pro: 7.0 g/dL / ALK PHOS: 103 U/L / ALT: 20 U/L DA / AST: 18 U/L / GGT: x           PT/INR - ( 05 Aug 2019 08:52 )   PT: 11.8 sec;   INR: 1.06 ratio         PTT - ( 05 Aug 2019 08:52 )  PTT:28.4 sec  Triglycerides, Serum: 100 mg/dL ( @ 06:16)        Stool Results:          RADIOLOGY RESULTS:    EXAM:  CT ANGIO ABD PELV (W)AW IC                            PROCEDURE DATE:  2019          INTERPRETATION:  CTA of the abdomen and pelvis without and with IV   contrast    Indication: Diffuse abdominal pain and hematemesis.    Technique: Axial multidetector CT images of the abdomen and pelvis are   acquired without and with IV contrast (90 cc Omnipaque-350 administered,   10 cc discarded) according to a GI bleed CTA protocol.    Comparison: None.    Findings: Limited sections through the lung bases demonstrate bilateral   pulmonary infiltrates. There is a nonspecific 1.0 cm right lower lobe   lung nodule (image 10 series 4). There is another 4 mm left lower lobe   lung nodule (image 1 series 4).     Apparent mural thickening of the distal esophagus. No evidence for active   contrast extravasation in the bowel lumen to suggest an active GI bleed.   No bowel obstruction.    No evidence for abdominal aortic dissection, or aneurysm. The celiac axis   artery, SMA and LIBAN are patent without stenosis. The main renal arteries   are patent bilaterally without stenosis.    Apparent sludge and gallstone in the gallbladder. No evidence for   thickened gallbladder wall or pericholecystic fluid.    The liver, pancreas, spleen, adrenals and kidneys appear unremarkable.    No evidence for free air, or ascites. Mildly enlarged 1.5 cm lymph node   at the gastrohepatic ligament.    The urinary bladder appears unremarkable. The prostate is mildly enlarged.    Impression: Bilateral pulmonary infiltrates. Follow-up chest CT may be   pursued in 4-6 weeks to ensure resolution. Attention should be directed   to the nonspecific lung nodules bilaterally on the follow-up chest CT to   ensure stability or resolution.    Apparent mural thickening of the distal esophagus. EGD may be pursued for   further evaluation. No evidence for active contrast extravasation in the   bowel lumen to suggest an active GI bleed. No bowel obstruction.    Apparent sludge and gallstone in the gallbladder.     Mildly enlarged 1.5 cm lymph node at the gastrohepatic ligament.                HELEN RODRIGUEZ M.D., ATTENDING RADIOLOGIST  This document has been electronically signed. Aug  5 2019  1:35PM                SURGICAL PATHOLOGY:

## 2019-08-07 LAB
ALBUMIN SERPL ELPH-MCNC: 2.4 G/DL — LOW (ref 3.5–5)
ALP SERPL-CCNC: 95 U/L — SIGNIFICANT CHANGE UP (ref 40–120)
ALT FLD-CCNC: 19 U/L DA — SIGNIFICANT CHANGE UP (ref 10–60)
ANION GAP SERPL CALC-SCNC: 9 MMOL/L — SIGNIFICANT CHANGE UP (ref 5–17)
AST SERPL-CCNC: 14 U/L — SIGNIFICANT CHANGE UP (ref 10–40)
BILIRUB DIRECT SERPL-MCNC: 0.2 MG/DL — SIGNIFICANT CHANGE UP (ref 0–0.2)
BILIRUB INDIRECT FLD-MCNC: 0.4 MG/DL — SIGNIFICANT CHANGE UP (ref 0.2–1)
BILIRUB SERPL-MCNC: 0.6 MG/DL — SIGNIFICANT CHANGE UP (ref 0.2–1.2)
BUN SERPL-MCNC: 25 MG/DL — HIGH (ref 7–18)
CALCIUM SERPL-MCNC: 8.6 MG/DL — SIGNIFICANT CHANGE UP (ref 8.4–10.5)
CHLORIDE SERPL-SCNC: 110 MMOL/L — HIGH (ref 96–108)
CO2 SERPL-SCNC: 28 MMOL/L — SIGNIFICANT CHANGE UP (ref 22–31)
CREAT SERPL-MCNC: 1.11 MG/DL — SIGNIFICANT CHANGE UP (ref 0.5–1.3)
ETHANOL SERPL-MCNC: <3 MG/DL — SIGNIFICANT CHANGE UP (ref 0–10)
GLUCOSE BLDC GLUCOMTR-MCNC: 166 MG/DL — HIGH (ref 70–99)
GLUCOSE BLDC GLUCOMTR-MCNC: 168 MG/DL — HIGH (ref 70–99)
GLUCOSE BLDC GLUCOMTR-MCNC: 183 MG/DL — HIGH (ref 70–99)
GLUCOSE BLDC GLUCOMTR-MCNC: 184 MG/DL — HIGH (ref 70–99)
GLUCOSE BLDC GLUCOMTR-MCNC: 188 MG/DL — HIGH (ref 70–99)
GLUCOSE BLDC GLUCOMTR-MCNC: 259 MG/DL — HIGH (ref 70–99)
GLUCOSE SERPL-MCNC: 187 MG/DL — HIGH (ref 70–99)
HCT VFR BLD CALC: 44.6 % — SIGNIFICANT CHANGE UP (ref 39–50)
HGB BLD-MCNC: 14.5 G/DL — SIGNIFICANT CHANGE UP (ref 13–17)
MAGNESIUM SERPL-MCNC: 2.2 MG/DL — SIGNIFICANT CHANGE UP (ref 1.6–2.6)
MCHC RBC-ENTMCNC: 29.5 PG — SIGNIFICANT CHANGE UP (ref 27–34)
MCHC RBC-ENTMCNC: 32.5 GM/DL — SIGNIFICANT CHANGE UP (ref 32–36)
MCV RBC AUTO: 90.8 FL — SIGNIFICANT CHANGE UP (ref 80–100)
NRBC # BLD: 0 /100 WBCS — SIGNIFICANT CHANGE UP (ref 0–0)
PHOSPHATE SERPL-MCNC: 2.1 MG/DL — LOW (ref 2.5–4.5)
PLATELET # BLD AUTO: 202 K/UL — SIGNIFICANT CHANGE UP (ref 150–400)
POTASSIUM SERPL-MCNC: 3.5 MMOL/L — SIGNIFICANT CHANGE UP (ref 3.5–5.3)
POTASSIUM SERPL-SCNC: 3.5 MMOL/L — SIGNIFICANT CHANGE UP (ref 3.5–5.3)
PROT SERPL-MCNC: 6.7 G/DL — SIGNIFICANT CHANGE UP (ref 6–8.3)
RBC # BLD: 4.91 M/UL — SIGNIFICANT CHANGE UP (ref 4.2–5.8)
RBC # FLD: 12.6 % — SIGNIFICANT CHANGE UP (ref 10.3–14.5)
SODIUM SERPL-SCNC: 147 MMOL/L — HIGH (ref 135–145)
WBC # BLD: 8.15 K/UL — SIGNIFICANT CHANGE UP (ref 3.8–10.5)
WBC # FLD AUTO: 8.15 K/UL — SIGNIFICANT CHANGE UP (ref 3.8–10.5)

## 2019-08-07 PROCEDURE — 88305 TISSUE EXAM BY PATHOLOGIST: CPT | Mod: 26

## 2019-08-07 PROCEDURE — 88312 SPECIAL STAINS GROUP 1: CPT | Mod: 26

## 2019-08-07 RX ORDER — LISINOPRIL 2.5 MG/1
5 TABLET ORAL DAILY
Refills: 0 | Status: DISCONTINUED | OUTPATIENT
Start: 2019-08-07 | End: 2019-08-08

## 2019-08-07 RX ORDER — SUCRALFATE 1 G
1 TABLET ORAL EVERY 6 HOURS
Refills: 0 | Status: DISCONTINUED | OUTPATIENT
Start: 2019-08-07 | End: 2019-08-07

## 2019-08-07 RX ORDER — SUCRALFATE 1 G
1 TABLET ORAL EVERY 6 HOURS
Refills: 0 | Status: DISCONTINUED | OUTPATIENT
Start: 2019-08-07 | End: 2019-08-08

## 2019-08-07 RX ADMIN — Medication 1 MILLIGRAM(S): at 11:09

## 2019-08-07 RX ADMIN — Medication 1 GRAM(S): at 22:51

## 2019-08-07 RX ADMIN — Medication 1 TABLET(S): at 11:09

## 2019-08-07 RX ADMIN — Medication 3: at 17:11

## 2019-08-07 RX ADMIN — ONDANSETRON 4 MILLIGRAM(S): 8 TABLET, FILM COATED ORAL at 13:30

## 2019-08-07 RX ADMIN — Medication 30 MILLILITER(S): at 22:51

## 2019-08-07 RX ADMIN — INSULIN GLARGINE 24 UNIT(S): 100 INJECTION, SOLUTION SUBCUTANEOUS at 22:52

## 2019-08-07 RX ADMIN — PANTOPRAZOLE SODIUM 40 MILLIGRAM(S): 20 TABLET, DELAYED RELEASE ORAL at 05:55

## 2019-08-07 RX ADMIN — BENZOCAINE AND MENTHOL 1 LOZENGE: 5; 1 LIQUID ORAL at 13:30

## 2019-08-07 RX ADMIN — BENZOCAINE AND MENTHOL 1 LOZENGE: 5; 1 LIQUID ORAL at 22:52

## 2019-08-07 RX ADMIN — Medication 1 GRAM(S): at 17:11

## 2019-08-07 RX ADMIN — ONDANSETRON 4 MILLIGRAM(S): 8 TABLET, FILM COATED ORAL at 22:52

## 2019-08-07 RX ADMIN — Medication 1: at 12:35

## 2019-08-07 RX ADMIN — SODIUM CHLORIDE 100 MILLILITER(S): 9 INJECTION, SOLUTION INTRAVENOUS at 05:57

## 2019-08-07 RX ADMIN — LISINOPRIL 5 MILLIGRAM(S): 2.5 TABLET ORAL at 11:09

## 2019-08-07 RX ADMIN — Medication 1 GRAM(S): at 11:09

## 2019-08-07 RX ADMIN — ONDANSETRON 4 MILLIGRAM(S): 8 TABLET, FILM COATED ORAL at 05:55

## 2019-08-07 RX ADMIN — Medication 100 MILLIGRAM(S): at 11:09

## 2019-08-07 RX ADMIN — PANTOPRAZOLE SODIUM 40 MILLIGRAM(S): 20 TABLET, DELAYED RELEASE ORAL at 17:11

## 2019-08-07 NOTE — PROGRESS NOTE ADULT - PROBLEM SELECTOR PLAN 4
pt has been drinking 1-2 drinks per day for last 2 months.  last drink was 3 days ago.  CIWA monitoring  ativan PRN

## 2019-08-07 NOTE — DIETITIAN INITIAL EVALUATION ADULT. - PERTINENT LABORATORY DATA
08-07 Na147 mmol/L<H> Glu 187 mg/dL<H> K+ 3.5 mmol/L Cr  1.11 mg/dL BUN 25 mg/dL<H>   08-07 Phos 2.1 mg/dL<L>   08-07 Alb 2.4 g/dL<L>  Finger stick uoyrn=593 to 319     08-06 [id number] 9.5 %<H>   08-06 Chol 183 mg/dL  mg/dL HDL 63 mg/dL Trig 100 mg/dL

## 2019-08-07 NOTE — DIETITIAN INITIAL EVALUATION ADULT. - PERTINENT MEDS FT
MEDICATIONS  (STANDING):  benzocaine 15 mG/menthol 3.6 mG (Sugar-Free) Lozenge 1 Lozenge Oral three times a day  folic acid 1 milliGRAM(s) Oral daily  insulin glargine Injectable (LANTUS) 24 Unit(s) SubCutaneous at bedtime  insulin lispro (HumaLOG) corrective regimen sliding scale   SubCutaneous Before meals and at bedtime  lisinopril 5 milliGRAM(s) Oral daily  multivitamin 1 Tablet(s) Oral daily  ondansetron Injectable 4 milliGRAM(s) IV Push every 8 hours  pantoprazole  Injectable 40 milliGRAM(s) IV Push every 12 hours  sodium chloride 0.45% 1000 milliLiter(s) (100 mL/Hr) IV Continuous <Continuous>  sucralfate 1 Gram(s) Oral every 6 hours  thiamine 100 milliGRAM(s) Oral daily    MEDICATIONS  (PRN):  aluminum hydroxide/magnesium hydroxide/simethicone Suspension 30 milliLiter(s) Oral every 6 hours PRN Dyspepsia  aluminum hydroxide/magnesium hydroxide/simethicone Suspension 30 milliLiter(s) Oral every 6 hours PRN Dyspepsia  LORazepam   Injectable 2 milliGRAM(s) IV Push every 4 hours PRN CIWA-Ar score 8 or greater

## 2019-08-07 NOTE — PROGRESS NOTE ADULT - PROBLEM SELECTOR PLAN 2
pt is found to have cholelithiases on CT abdomen.  Generalized abdominal tenderness on examination.  nausea and vomiting.  WBC count of 15K.  GI consult. dr reno  may need for further testing / HIDA  GI recommended F/U US abdomen

## 2019-08-07 NOTE — PROGRESS NOTE ADULT - SUBJECTIVE AND OBJECTIVE BOX
PGY 1 Note discussed with supervising resident and primary attending    Patient is a 53y old  Male who presents with a chief complaint of abdominal pain nausea vomiting and hematemesis (06 Aug 2019 13:54)      INTERVAL HPI/OVERNIGHT EVENTS: throat pain, nausea,    MEDICATIONS  (STANDING):  benzocaine 15 mG/menthol 3.6 mG (Sugar-Free) Lozenge 1 Lozenge Oral three times a day  folic acid 1 milliGRAM(s) Oral daily  insulin glargine Injectable (LANTUS) 24 Unit(s) SubCutaneous at bedtime  insulin lispro (HumaLOG) corrective regimen sliding scale   SubCutaneous Before meals and at bedtime  lisinopril 5 milliGRAM(s) Oral daily  multivitamin 1 Tablet(s) Oral daily  ondansetron Injectable 4 milliGRAM(s) IV Push every 8 hours  pantoprazole  Injectable 40 milliGRAM(s) IV Push every 12 hours  sodium chloride 0.45% 1000 milliLiter(s) (100 mL/Hr) IV Continuous <Continuous>  sucralfate 1 Gram(s) Oral every 6 hours  thiamine 100 milliGRAM(s) Oral daily    MEDICATIONS  (PRN):  aluminum hydroxide/magnesium hydroxide/simethicone Suspension 30 milliLiter(s) Oral every 6 hours PRN Dyspepsia  aluminum hydroxide/magnesium hydroxide/simethicone Suspension 30 milliLiter(s) Oral every 6 hours PRN Dyspepsia  LORazepam   Injectable 2 milliGRAM(s) IV Push every 4 hours PRN CIWA-Ar score 8 or greater      __________________________________________________  REVIEW OF SYSTEMS:    CONSTITUTIONAL: No fever,   EYES: no acute visual disturbances  NECK: No pain or stiffness  RESPIRATORY: No cough; No shortness of breath  CARDIOVASCULAR: No chest pain, no palpitations  GASTROINTESTINAL: nausea   NEUROLOGICAL: No headache or numbness, no tremors  MUSCULOSKELETAL: No joint pain, no muscle pain  GENITOURINARY: no dysuria, no frequency, no hesitancy  PSYCHIATRY: no depression , no anxiety  ALL OTHER  ROS negative        Vital Signs Last 24 Hrs  T(C): 37.2 (07 Aug 2019 14:56), Max: 37.2 (07 Aug 2019 14:56)  T(F): 99 (07 Aug 2019 14:56), Max: 99 (07 Aug 2019 14:56)  HR: 90 (07 Aug 2019 14:56) (90 - 102)  BP: 163/80 (07 Aug 2019 14:56) (163/80 - 188/71)  BP(mean): --  RR: 18 (07 Aug 2019 14:56) (17 - 18)  SpO2: 95% (07 Aug 2019 14:56) (95% - 97%)    ________________________________________________  PHYSICAL EXAM:  GENERAL: NAD  HEENT: Normocephalic;  conjunctivae and sclerae clear; moist mucous membranes;   NECK : supple  CHEST/LUNG: Clear to auscultation bilaterally with good air entry   HEART: S1 S2  regular; no murmurs, gallops or rubs  ABDOMEN: Soft, mildly tender, Nondistended; Bowel sounds present  EXTREMITIES: no cyanosis; no edema; no calf tenderness  SKIN: warm and dry; no rash  NERVOUS SYSTEM:  Awake and alert; Oriented  to place, person and time ; no new deficits    _________________________________________________  LABS:                        14.5   8.15  )-----------( 202      ( 07 Aug 2019 06:40 )             44.6     08-07    147<H>  |  110<H>  |  25<H>  ----------------------------<  187<H>  3.5   |  28  |  1.11    Ca    8.6      07 Aug 2019 06:40  Phos  2.1     08-07  Mg     2.2     08-07    TPro  6.7  /  Alb  2.4<L>  /  TBili  0.6  /  DBili  0.2  /  AST  14  /  ALT  19  /  AlkPhos  95  08-07        CAPILLARY BLOOD GLUCOSE      POCT Blood Glucose.: 259 mg/dL (07 Aug 2019 16:26)  POCT Blood Glucose.: 183 mg/dL (07 Aug 2019 11:53)  POCT Blood Glucose.: 166 mg/dL (07 Aug 2019 06:10)  POCT Blood Glucose.: 188 mg/dL (07 Aug 2019 00:00)  POCT Blood Glucose.: 177 mg/dL (06 Aug 2019 21:56)        RADIOLOGY & ADDITIONAL TESTS:    Imaging Personally Reviewed:  YES/NO  < from: CT Angio Abdomen and Pelvis w/ IV Cont (08.05.19 @ 13:09) >  Impression: Bilateral pulmonary infiltrates. Follow-up chest CT may be   pursued in 4-6 weeks to ensure resolution. Attention should be directed   to the nonspecific lung nodules bilaterally on the follow-up chest CT to   ensure stability or resolution.    Apparent mural thickening of the distal esophagus. EGD may be pursued for   further evaluation. No evidence for active contrast extravasation in the   bowel lumen to suggest an active GI bleed. No bowel obstruction.    Apparent sludge and gallstone in the gallbladder.     Mildly enlarged 1.5 cm lymph node at the gastrohepatic ligament.      < end of copied text >    Consultant(s) Notes Reviewed:   YES  Care Discussed with Consultants : GI    Plan of care was discussed with patient and /or primary care giver; all questions and concerns were addressed and care was aligned with patient's wishes.

## 2019-08-07 NOTE — PROGRESS NOTE ADULT - ASSESSMENT
53 years old male from home with PMHx of diabetes (on insulin), alcoholism (1-2 drinks daily for last 2 months) presented to ED with complain of blood in vomitus. Patient is having severe generalized abdominal pain which is intermittent and associated with nausea and multiple episodes of vomiting. His last drink about 3 days ago. Pt was admitted for further management of hematemesis and pain. EGD peformed by GI 08/07 showed 1- Severe LA Grade D esophagitis 2- Mild gastritis. pT started on clear liq diet will advance as tolerated

## 2019-08-07 NOTE — DIETITIAN INITIAL EVALUATION ADULT. - NS FNS WEIGHT USED FOR CALC
Ht=5' 7"   VGB=695 lb   Admission bp=101 lb ??    Current bt=450.6 lb 8/5/19-->160.4 lb 8/7/19    BMI=25.1/ideal

## 2019-08-07 NOTE — PROGRESS NOTE ADULT - ASSESSMENT
_________________________________________________________________________________________  ========>>  M E D I C A L   A T T E N D I N G    F O L L O W  U P  N O T E  <<=========  -----------------------------------------------------------------------------------------------------    - Patient seen and examined by me approximately thirty minutes ago.  - In summary,  WU FARIAS is a 53y year old man who originally presented with abd pain, vomiting, ETOH   - Patient today overall doing ok, comfortable, still with some pain in throat ut overall tolerating clears     ==================>> REVIEW OF SYSTEM <<=================    GEN: no fever, no chills, pain as above   RESP: no SOB, no cough, no sputum  CVS: no chest pain, no palpitations, no edema  GI: + abdominal pain, sore throat , no nausea  : no dysuria, no frequency, no hematuria  Neuro: no headache, no dizziness  Derm : no itching, no rash    ==================>> PHYSICAL EXAM <<=================    GEN: A&O X 3 , NAD , comfortable in bed   HEENT: NCAT, PERRL, MMM, hearing intact  Neck: supple , no JVD  CVS: S1S2 , regular , No M/R/G appreciated  PULM: CTA B/L,  no W/R/R appreciated  ABD.: soft. non tender, non distended,  bowel sounds present  Extrem: intact pulses , no edema   PSYCH : normal mood,  not anxious      ==================>> MEDICATIONS <<====================    benzocaine 15 mG/menthol 3.6 mG (Sugar-Free) Lozenge 1 Lozenge Oral three times a day  folic acid 1 milliGRAM(s) Oral daily  insulin glargine Injectable (LANTUS) 24 Unit(s) SubCutaneous at bedtime  insulin lispro (HumaLOG) corrective regimen sliding scale   SubCutaneous Before meals and at bedtime  lisinopril 5 milliGRAM(s) Oral daily  multivitamin 1 Tablet(s) Oral daily  ondansetron Injectable 4 milliGRAM(s) IV Push every 8 hours  pantoprazole  Injectable 40 milliGRAM(s) IV Push every 12 hours  sodium chloride 0.45% 1000 milliLiter(s) IV Continuous <Continuous>  sucralfate 1 Gram(s) Oral every 6 hours  thiamine 100 milliGRAM(s) Oral daily    MEDICATIONS  (PRN):  aluminum hydroxide/magnesium hydroxide/simethicone Suspension 30 milliLiter(s) Oral every 6 hours PRN Dyspepsia  aluminum hydroxide/magnesium hydroxide/simethicone Suspension 30 milliLiter(s) Oral every 6 hours PRN Dyspepsia  LORazepam   Injectable 2 milliGRAM(s) IV Push every 4 hours PRN CIWA-Ar score 8 or greater    ==================>> VITAL SIGNS <<==================    Vital Signs Last 24 Hrs  T(C): 37.2 (08-07-19 @ 14:56)  T(F): 99 (08-07-19 @ 14:56), Max: 99 (08-07-19 @ 14:56)  HR: 90 (08-07-19 @ 14:56) (90 - 102)  BP: 163/80 (08-07-19 @ 14:56)  RR: 18 (08-07-19 @ 14:56) (17 - 18)  SpO2: 95% (08-07-19 @ 14:56) (95% - 97%)      POCT Blood Glucose.: 259 mg/dL (07 Aug 2019 16:26)  POCT Blood Glucose.: 183 mg/dL (07 Aug 2019 11:53)  POCT Blood Glucose.: 166 mg/dL (07 Aug 2019 06:10)  POCT Blood Glucose.: 188 mg/dL (07 Aug 2019 00:00)  POCT Blood Glucose.: 177 mg/dL (06 Aug 2019 21:56)     ==================>> LAB AND IMAGING <<==================                        14.5   8.15  )-----------( 202      ( 07 Aug 2019 06:40 )             44.6        08-07    147<H>  |  110<H>  |  25<H>  ----------------------------<  187<H>  3.5   |  28  |  1.11    Ca    8.6      07 Aug 2019 06:40  Phos  2.1     08-07  Mg     2.2     08-07    TPro  6.7  /  Alb  2.4<L>  /  TBili  0.6  /  DBili  0.2  /  AST  14  /  ALT  19  /  AlkPhos  95  08-07    WBC count:   8.15 <<== ,  10.21 <<== ,  14.17 <<== ,  16.53 <<==   Hemoglobin:   14.5 <<==,  14.3 <<==,  13.3 <<==,  16.2 <<==  platelets:  202 <==, 200 <==, 180 <==, 201 <==    Creatinine:  1.11  <<==, 1.25  <<==, 1.29  <<==, 1.50  <<==  Sodium:   147  <==, 145  <==, 139  <==       AST:          14 <== , 18 <== , 28 <==      ALT:        19  <== , 20  <== , 28  <==      AP:        95  <=, 103  <=, 131  <=     Bili:        0.6  <=, 0.5  <=, 1.0  <=    ENDOSCOPY::  Impression: 1- Severe LA Grade D esophagitis 2- Mild gastritis   Plan: 1- Resume diet and medications 2- Avoid NSAIDs 3- PPI Daily 4- Carafate Daily 5- Follow up pathology     ___________________________________________________________________________________  ===============>>  A S S E S S M E N T   A N D   P L A N <<===============  ------------------------------------------------------------------------------------------    · Assessment		  53 years old male from home with PMHx of diabetes (on insulin), alcoholism (1-2 drinks daily for last 2 months) presented to ED with complain of blood in vomitus.       Problem/Plan - 1:  ·  Problem: Hematemesis.       pt presented with 1-2 episodes of hematemesis/ maroon color blood with vomiting.  Hemoglobin:   14.5 <<==,  14.3 <<==,  13.3 <<==,  16.2 <<==  protonix and Carafate as above   GI appreciated     EGD as above with severe esophagitis   monitor for further episodes.  antiemetics as needed   advance diet as tolerated    Problem/Plan - 2:  ·  Problem: Cholelithiases.        pt is found to have cholelithiases on CT abdomen.  Generalized abdominal tenderness on examination.  nausea and vomiting.  WBC count of 15K.  GI f/u  further testing / HIDA  if symptomatic     Problem/Plan - 3:  ·  Problem: Hyperglycemia     pt presented with hyperglycemia / diabetic ketosis without acidosis     pt has history of DM for last 11 years and takes variable number of lantus units based on his blood sugar levels. on average 24 units.  pt is started on lantus 20 units and insulin HSS  f/u hba1c.   for now better : endo as needed     Problem/Plan - 4:  ·  Problem: Alcohol withdrawal  CIWA monitoring.   overall doing well  social work follow up     -GI/DVT Prophylaxis.    --------------------------------------------  Case discussed with pt, HS  Education given on findings and plan of care  ___________________________  H. DONOVAN Ellsworth.  Pager: [phone]

## 2019-08-07 NOTE — CHART NOTE - NSCHARTNOTEFT_GEN_A_CORE
Esophagogastroduodenoscopy Report  Indication:  Hematemesis, Abdominal pain   Referring MD: Dr. Ellsworth  Instrument:  #[id number]  Anesthesia: MAC  Consent:  Informed consent was obtained from the patient after providing any opportunity for questions  Procedure: The gastroscope was gently passed through the incisoral orifice into the oral cavity and under direct visualization the esophagus was intubated. The endoscope was passed down the esophagus, through the stomach and into the 3rd portion. Color, texture, mucosa and anatomy of the esophagus, stomach, and duodenum were carefully examined with the scope. The patient tolerated the procedure well. After completion of the examination, the patient was transferred to the recovery room.   Preparation: NPO   Findings:   Oropharynx	Normal  Esophagus	Severe LA Grade D esophagitis extending proximally to the mid esophagus.   EG-junction	AA  Cardia	Normal.  Body	Mild erythema and edema. Random biopsy taken. [1]  Antrum	Mild erythema and edema. Random biopsy taken.[1]  Pylorus	Normal.  Duodenal Bulb	Normal.  2nd portion	Normal.  3rd portion	Normal.  Date and time:8/7/2019 8:36:32 AM  EBL:0  Impression: 1- Severe LA Grade D esophagitis 2- Mild gastritis     Plan: 1- Resume diet and medications 2- Avoid NSAIDs 3- PPI Daily 4- Carafate Daily 5- Follow up pathology           Procedure Start Time: 8:30 am   Procedure End Time:    8:32 am              Attending:       Favio Chaudhry M.D.   Date and Time: 8/7/2019 8:36:32 AM

## 2019-08-07 NOTE — PROGRESS NOTE ADULT - PROBLEM SELECTOR PLAN 1
pt presented with 1-2 episodes of hematemesis/ maroon color blood with vomiting.  patient has been having nausea   H and H is 14.5 <<==,  13.3 <<==,  16.2 <<=  Type and cross match ordered.  IV protonix BID.  sucralfate ordered as per GI recc  close hemodynamic monitoring.  IV zofran standing PRN as needed.  diet clear liq advance as tolerated  GI Dr. Monroe

## 2019-08-08 LAB
ANION GAP SERPL CALC-SCNC: 6 MMOL/L — SIGNIFICANT CHANGE UP (ref 5–17)
BUN SERPL-MCNC: 18 MG/DL — SIGNIFICANT CHANGE UP (ref 7–18)
CALCIUM SERPL-MCNC: 8.3 MG/DL — LOW (ref 8.4–10.5)
CHLORIDE SERPL-SCNC: 108 MMOL/L — SIGNIFICANT CHANGE UP (ref 96–108)
CO2 SERPL-SCNC: 30 MMOL/L — SIGNIFICANT CHANGE UP (ref 22–31)
CREAT SERPL-MCNC: 1.1 MG/DL — SIGNIFICANT CHANGE UP (ref 0.5–1.3)
GLUCOSE BLDC GLUCOMTR-MCNC: 108 MG/DL — HIGH (ref 70–99)
GLUCOSE BLDC GLUCOMTR-MCNC: 108 MG/DL — HIGH (ref 70–99)
GLUCOSE BLDC GLUCOMTR-MCNC: 145 MG/DL — HIGH (ref 70–99)
GLUCOSE BLDC GLUCOMTR-MCNC: 149 MG/DL — HIGH (ref 70–99)
GLUCOSE SERPL-MCNC: 130 MG/DL — HIGH (ref 70–99)
HCT VFR BLD CALC: 42.6 % — SIGNIFICANT CHANGE UP (ref 39–50)
HGB BLD-MCNC: 14.2 G/DL — SIGNIFICANT CHANGE UP (ref 13–17)
MAGNESIUM SERPL-MCNC: 2 MG/DL — SIGNIFICANT CHANGE UP (ref 1.6–2.6)
MCHC RBC-ENTMCNC: 29.6 PG — SIGNIFICANT CHANGE UP (ref 27–34)
MCHC RBC-ENTMCNC: 33.3 GM/DL — SIGNIFICANT CHANGE UP (ref 32–36)
MCV RBC AUTO: 88.8 FL — SIGNIFICANT CHANGE UP (ref 80–100)
NRBC # BLD: 0 /100 WBCS — SIGNIFICANT CHANGE UP (ref 0–0)
PHOSPHATE SERPL-MCNC: 2 MG/DL — LOW (ref 2.5–4.5)
PLATELET # BLD AUTO: 216 K/UL — SIGNIFICANT CHANGE UP (ref 150–400)
POTASSIUM SERPL-MCNC: 3.2 MMOL/L — LOW (ref 3.5–5.3)
POTASSIUM SERPL-SCNC: 3.2 MMOL/L — LOW (ref 3.5–5.3)
RBC # BLD: 4.8 M/UL — SIGNIFICANT CHANGE UP (ref 4.2–5.8)
RBC # FLD: 12.1 % — SIGNIFICANT CHANGE UP (ref 10.3–14.5)
SODIUM SERPL-SCNC: 144 MMOL/L — SIGNIFICANT CHANGE UP (ref 135–145)
WBC # BLD: 9.14 K/UL — SIGNIFICANT CHANGE UP (ref 3.8–10.5)
WBC # FLD AUTO: 9.14 K/UL — SIGNIFICANT CHANGE UP (ref 3.8–10.5)

## 2019-08-08 PROCEDURE — 76700 US EXAM ABDOM COMPLETE: CPT | Mod: 26

## 2019-08-08 RX ORDER — POTASSIUM CHLORIDE 20 MEQ
40 PACKET (EA) ORAL EVERY 4 HOURS
Refills: 0 | Status: COMPLETED | OUTPATIENT
Start: 2019-08-08 | End: 2019-08-08

## 2019-08-08 RX ORDER — SODIUM,POTASSIUM PHOSPHATES 278-250MG
1 POWDER IN PACKET (EA) ORAL THREE TIMES A DAY
Refills: 0 | Status: COMPLETED | OUTPATIENT
Start: 2019-08-08 | End: 2019-08-08

## 2019-08-08 RX ORDER — LISINOPRIL 2.5 MG/1
10 TABLET ORAL DAILY
Refills: 0 | Status: DISCONTINUED | OUTPATIENT
Start: 2019-08-08 | End: 2019-08-09

## 2019-08-08 RX ORDER — INSULIN GLARGINE 100 [IU]/ML
24 INJECTION, SOLUTION SUBCUTANEOUS
Qty: 0 | Refills: 0 | DISCHARGE

## 2019-08-08 RX ORDER — SUCRALFATE 1 G
1 TABLET ORAL
Refills: 0 | Status: DISCONTINUED | OUTPATIENT
Start: 2019-08-08 | End: 2019-08-09

## 2019-08-08 RX ORDER — LISINOPRIL 2.5 MG/1
5 TABLET ORAL ONCE
Refills: 0 | Status: COMPLETED | OUTPATIENT
Start: 2019-08-08 | End: 2019-08-08

## 2019-08-08 RX ADMIN — Medication 1 GRAM(S): at 12:48

## 2019-08-08 RX ADMIN — PANTOPRAZOLE SODIUM 40 MILLIGRAM(S): 20 TABLET, DELAYED RELEASE ORAL at 06:47

## 2019-08-08 RX ADMIN — LISINOPRIL 5 MILLIGRAM(S): 2.5 TABLET ORAL at 06:47

## 2019-08-08 RX ADMIN — Medication 1 PACKET(S): at 21:31

## 2019-08-08 RX ADMIN — PANTOPRAZOLE SODIUM 40 MILLIGRAM(S): 20 TABLET, DELAYED RELEASE ORAL at 17:17

## 2019-08-08 RX ADMIN — Medication 30 MILLILITER(S): at 21:26

## 2019-08-08 RX ADMIN — LISINOPRIL 10 MILLIGRAM(S): 2.5 TABLET ORAL at 21:28

## 2019-08-08 RX ADMIN — Medication 1 GRAM(S): at 17:18

## 2019-08-08 RX ADMIN — BENZOCAINE AND MENTHOL 1 LOZENGE: 5; 1 LIQUID ORAL at 21:30

## 2019-08-08 RX ADMIN — Medication 100 MILLIGRAM(S): at 12:48

## 2019-08-08 RX ADMIN — Medication 1 MILLIGRAM(S): at 12:48

## 2019-08-08 RX ADMIN — ONDANSETRON 4 MILLIGRAM(S): 8 TABLET, FILM COATED ORAL at 14:12

## 2019-08-08 RX ADMIN — Medication 30 MILLILITER(S): at 06:50

## 2019-08-08 RX ADMIN — ONDANSETRON 4 MILLIGRAM(S): 8 TABLET, FILM COATED ORAL at 06:47

## 2019-08-08 RX ADMIN — LISINOPRIL 5 MILLIGRAM(S): 2.5 TABLET ORAL at 09:48

## 2019-08-08 RX ADMIN — BENZOCAINE AND MENTHOL 1 LOZENGE: 5; 1 LIQUID ORAL at 06:48

## 2019-08-08 RX ADMIN — Medication 1 PACKET(S): at 09:48

## 2019-08-08 RX ADMIN — Medication 40 MILLIEQUIVALENT(S): at 09:49

## 2019-08-08 RX ADMIN — BENZOCAINE AND MENTHOL 1 LOZENGE: 5; 1 LIQUID ORAL at 17:18

## 2019-08-08 RX ADMIN — Medication 1 TABLET(S): at 12:47

## 2019-08-08 RX ADMIN — Medication 1 GRAM(S): at 06:47

## 2019-08-08 RX ADMIN — INSULIN GLARGINE 24 UNIT(S): 100 INJECTION, SOLUTION SUBCUTANEOUS at 22:00

## 2019-08-08 RX ADMIN — ONDANSETRON 4 MILLIGRAM(S): 8 TABLET, FILM COATED ORAL at 21:28

## 2019-08-08 RX ADMIN — Medication 40 MILLIEQUIVALENT(S): at 14:13

## 2019-08-08 NOTE — PROGRESS NOTE ADULT - ASSESSMENT
_________________________________________________________________________________________  ========>>  M E D I C A L   A T T E N D I N G    F O L L O W  U P  N O T E  <<=========  -----------------------------------------------------------------------------------------------------    - Patient seen and examined by me earlier today.   - In summary,  WU FARIAS is a 53y year old man who originally presented with abd pain, vomiting, ETOH   - Patient today overall doing ok, comfortable, still with some pain in throat but tolerating some liquid diet     ==================>> REVIEW OF SYSTEM <<=================    GEN: no fever, no chills, pain as above   RESP: no SOB, no cough, no sputum  CVS: no chest pain, no palpitations, no edema  GI: + abdominal pain, sore throat , no nausea  : no dysuria, no frequency, no hematuria  Neuro: no headache, no dizziness  Derm : no itching, no rash    ==================>> PHYSICAL EXAM <<=================    GEN: A&O X 3 , NAD , comfortable in bed   HEENT: NCAT, PERRL, MMM, hearing intact  Neck: supple , no JVD  CVS: S1S2 , regular , No M/R/G appreciated  PULM: CTA B/L,  no W/R/R appreciated  ABD.: soft. non tender, non distended,  bowel sounds present  Extrem: intact pulses , no edema   PSYCH : normal mood,  not anxious       ==================>> MEDICATIONS <<====================    benzocaine 15 mG/menthol 3.6 mG (Sugar-Free) Lozenge 1 Lozenge Oral three times a day  folic acid 1 milliGRAM(s) Oral daily  insulin glargine Injectable (LANTUS) 24 Unit(s) SubCutaneous at bedtime  insulin lispro (HumaLOG) corrective regimen sliding scale   SubCutaneous Before meals and at bedtime  lisinopril 10 milliGRAM(s) Oral daily  multivitamin 1 Tablet(s) Oral daily  ondansetron Injectable 4 milliGRAM(s) IV Push every 8 hours  pantoprazole  Injectable 40 milliGRAM(s) IV Push every 12 hours  potassium phosphate / sodium phosphate powder 1 Packet(s) Oral three times a day  sucralfate suspension 1 Gram(s) Oral four times a day    MEDICATIONS  (PRN):  aluminum hydroxide/magnesium hydroxide/simethicone Suspension 30 milliLiter(s) Oral every 6 hours PRN Dyspepsia  aluminum hydroxide/magnesium hydroxide/simethicone Suspension 30 milliLiter(s) Oral every 6 hours PRN Dyspepsia  LORazepam   Injectable 2 milliGRAM(s) IV Push every 4 hours PRN CIWA-Ar score 8 or greater    ==================>> VITAL SIGNS <<==================    Vital Signs Last 24 Hrs  T(C): 36.9 (08-08-19 @ 14:08)  T(F): 98.5 (08-08-19 @ 14:08), Max: 99 (08-07-19 @ 14:56)  HR: 75 (08-08-19 @ 14:08) (75 - 95)  BP: 151/65 (08-08-19 @ 14:08)  RR: 17 (08-08-19 @ 14:08) (17 - 18)  SpO2: 96% (08-08-19 @ 14:08) (93% - 96%)      POCT Blood Glucose.: 108 mg/dL (08 Aug 2019 11:58)  POCT Blood Glucose.: 108 mg/dL (08 Aug 2019 08:40)  POCT Blood Glucose.: 168 mg/dL (07 Aug 2019 22:46)  POCT Blood Glucose.: 184 mg/dL (07 Aug 2019 21:15)  POCT Blood Glucose.: 259 mg/dL (07 Aug 2019 16:26)     ==================>> LAB AND IMAGING <<==================                        14.2   9.14  )-----------( 216      ( 08 Aug 2019 07:07 )             42.6        144  |  108  |  18  ----------------------------<  130<H>  3.2<L>   |  30  |  1.10    Ca    8.3<L>      08 Aug 2019 07:07  Phos  2.0     08-08  Mg     2.0     08-08    TPro  6.7  /  Alb  2.4<L>  /  TBili  0.6  /  DBili  0.2  /  AST  14  /  ALT  19  /  AlkPhos  95  08-07    WBC count:   9.14 <<== ,  8.15 <<== ,  10.21 <<== ,  14.17 <<== ,  16.53 <<==   Hemoglobin:   14.2 <<==,  14.5 <<==,  14.3 <<==,  13.3 <<==,  16.2 <<==  platelets:  216 <==, 202 <==, 200 <==, 180 <==, 201 <==    Creatinine:  1.10  <<==, 1.11  <<==, 1.25  <<==, 1.29  <<==, 1.50  <<==  Sodium:   144  <==, 147  <==, 145  <==, 139  <==       AST:          14 <== , 18 <== , 28 <==      ALT:        19  <== , 20  <== , 28  <==      AP:        95  <=, 103  <=, 131  <=     Bili:        0.6  <=, 0.5  <=, 1.0  <=    ENDOSCOPY::  Impression: 1- Severe LA Grade D esophagitis 2- Mild gastritis   Plan: 1- Resume diet and medications 2- Avoid NSAIDs 3- PPI Daily 4- Carafate Daily 5- Follow up pathology     ___________________________________________________________________________________  ===============>>  A S S E S S M E N T   A N D   P L A N <<===============  ------------------------------------------------------------------------------------------    · Assessment		  53 years old male from home with PMHx of diabetes (on insulin), alcoholism (1-2 drinks daily for last 2 months) presented to ED with complain of blood in vomitus.       Problem/Plan - 1:  ·  Problem: Hematemesis likely due to esophagitis   GI appreciated     EGD as above with severe esophagitis   monitor for further episodes.   protonix and Carafate as above , Maalox PRN   antiemetics as needed   advance diet as tolerated    Problem/Plan - 2:  ·  Problem: Cholelithiases.        pt is found to have cholelithiases on CT abdomen.  nausea and vomiting and leukocytosis resolved  pt tolerating diet for now   GI f/u  further testing / HIDA  if symptomatic     Problem/Plan - 3:  ·  Problem: Hyperglycemia     pt presented with hyperglycemia / diabetic ketosis without acidosis     pt has history of DM for last 11 years and takes variable number of lantus units based on his blood sugar levels. on average 24 units.  pt is started on lantus 20 units and insulin HSS  f/u hba1c.   for now better : endo as needed     Problem/Plan - 4:  ·  Problem: Alcohol withdrawal  CIWA monitoring.   overall doing well: no signs of withdrawal   social work follow up     -GI/DVT Prophylaxis.  OOB ambulate  DC planing in 24 hrs if tolerating  PO   --------------------------------------------  Case discussed with pt, HS  Education given on findings and plan of care  ___________________________  H. DONOVAN Ellsworht.  Pager: [phone]

## 2019-08-08 NOTE — PROGRESS NOTE ADULT - PROBLEM SELECTOR PLAN 2
pt is found to have cholelithiases on CT abdomen.  Generalized abdominal tenderness on examination.  nausea and vomiting.  WBC count of 15K.  GI consult. dr monroe  if symptomatic further testing / HIDA  GI Dr Monroe

## 2019-08-08 NOTE — PROGRESS NOTE ADULT - PROBLEM SELECTOR PLAN 1
pt presented with 1-2 episodes of hematemesis/ maroon color blood with vomiting.  patient has been having nausea   H and H is 14.5 <<==,  13.3 <<==,  16.2 <<=  Type and cross match ordered.  IV protonix BID.  sucralfate suspension q4 ordered as per GI recc  close hemodynamic monitoring.  IV zofran standing PRN as needed.  diet full liq advance as tolerated  GI Dr. Monroe

## 2019-08-08 NOTE — PROGRESS NOTE ADULT - PROBLEM SELECTOR PLAN 3
pt presented with hyperglycemia / diabetic ketosis without acidosis  no anion gap but acetone was high.  pt has history of DM for last 11 years and takes variable number of lantus units based on his blood sugar levels. on average 24 units.  pt is started on lantus 20 units and insulin HSS  hba1c 9.5

## 2019-08-08 NOTE — PROGRESS NOTE ADULT - ASSESSMENT
53 years old male from home with PMHx of diabetes (on insulin), alcoholism (1-2 drinks daily for last 2 months) presented to ED with complain of blood in vomitus. Patient is having severe generalized abdominal pain which is intermittent and associated with nausea and multiple episodes of vomiting. His last drink about 3 days ago. Pt was admitted for further management of hematemesis and pain. EGD peformed by GI 08/07 showed 1- Severe LA Grade D esophagitis 2- Mild gastritis. pT started on full liquid diet will advance as tolerated tomorrow

## 2019-08-08 NOTE — PROGRESS NOTE ADULT - ASSESSMENT
53 year old dirk garcia abdominal pain:    Plan:  Dyspepsia  Severe esophagiutis on EGD   PPi and Carafate suggested   Avoid ETOH     Gallstone:  His pain does not appear to be biliary in nature however the stone is not insignificant in size.   Surgery should be consulted inpatient versus outpatient     Advanced care planning was discussed with patient and family.  Advanced care planning forms were reviewed and discussed.  Risks, benefits and alternatives of gastroenterologic procedures were discussed in detail and all questions were answered.

## 2019-08-08 NOTE — PROGRESS NOTE ADULT - SUBJECTIVE AND OBJECTIVE BOX
Subjective:   No new events     Objective:    MEDICATIONS  (STANDING):  benzocaine 15 mG/menthol 3.6 mG (Sugar-Free) Lozenge 1 Lozenge Oral three times a day  folic acid 1 milliGRAM(s) Oral daily  insulin glargine Injectable (LANTUS) 24 Unit(s) SubCutaneous at bedtime  insulin lispro (HumaLOG) corrective regimen sliding scale   SubCutaneous Before meals and at bedtime  lisinopril 10 milliGRAM(s) Oral daily  multivitamin 1 Tablet(s) Oral daily  ondansetron Injectable 4 milliGRAM(s) IV Push every 8 hours  pantoprazole  Injectable 40 milliGRAM(s) IV Push every 12 hours  potassium phosphate / sodium phosphate powder 1 Packet(s) Oral three times a day  sucralfate suspension 1 Gram(s) Oral four times a day    MEDICATIONS  (PRN):  aluminum hydroxide/magnesium hydroxide/simethicone Suspension 30 milliLiter(s) Oral every 6 hours PRN Dyspepsia  aluminum hydroxide/magnesium hydroxide/simethicone Suspension 30 milliLiter(s) Oral every 6 hours PRN Dyspepsia  LORazepam   Injectable 2 milliGRAM(s) IV Push every 4 hours PRN CIWA-Ar score 8 or greater              Vital Signs Last 24 Hrs  T(C): 36.9 (08 Aug 2019 14:08), Max: 36.9 (08 Aug 2019 14:08)  T(F): 98.5 (08 Aug 2019 14:08), Max: 98.5 (08 Aug 2019 14:08)  HR: 75 (08 Aug 2019 14:08) (75 - 95)  BP: 151/65 (08 Aug 2019 14:08) (151/65 - 171/93)  BP(mean): --  RR: 17 (08 Aug 2019 14:08) (17 - 18)  SpO2: 96% (08 Aug 2019 14:08) (93% - 96%)      General:  Well developed, well nourished, alert and active, no pallor, NAD.  HEENT:    Normal appearance of conjunctiva, ears, nose, lips, oropharynx, and oral mucosa, anicteric.  Neck:  No masses, no asymmetry.  Lymph Nodes:  No lymphadenopathy.   Cardiovascular:  RRR normal S1/S2, no murmur.  Respiratory:  CTA B/L, normal respiratory effort.   Abdominal:   soft, no masses or tenderness, normoactive BS, NT/ND, no HSM.  Extremities:   No clubbing or cyanosis, normal capillary refill, no edema.   Skin:   No rash, jaundice, lesions, eczema.   Musculoskeletal:  No joint swelling, erythema or tenderness.   Neuro: No focal deficits.   Other:       LABS:                        14.2   9.14  )-----------( 216      ( 08 Aug 2019 07:07 )             42.6     08-08    144  |  108  |  18  ----------------------------<  130<H>  3.2<L>   |  30  |  1.10    Ca    8.3<L>      08 Aug 2019 07:07  Phos  2.0     08-08  Mg     2.0     08-08    TPro  6.7  /  Alb  2.4<L>  /  TBili  0.6  /  DBili  0.2  /  AST  14  /  ALT  19  /  AlkPhos  95  08-07          RADIOLOGY & ADDITIONAL TESTS:

## 2019-08-08 NOTE — PROGRESS NOTE ADULT - SUBJECTIVE AND OBJECTIVE BOX
PGY 1 Note discussed with supervising resident and primary attending    Patient is a 53y old  Male who presents with a chief complaint of abdominal pain nausea vomiting and hematemesis (07 Aug 2019 18:58)      INTERVAL HPI/OVERNIGHT EVENTS: complains of throat pain, unable to swallow pills- had to be crushed , CIWA 1 No ativan needed    MEDICATIONS  (STANDING):  benzocaine 15 mG/menthol 3.6 mG (Sugar-Free) Lozenge 1 Lozenge Oral three times a day  folic acid 1 milliGRAM(s) Oral daily  insulin glargine Injectable (LANTUS) 24 Unit(s) SubCutaneous at bedtime  insulin lispro (HumaLOG) corrective regimen sliding scale   SubCutaneous Before meals and at bedtime  lisinopril 10 milliGRAM(s) Oral daily  multivitamin 1 Tablet(s) Oral daily  ondansetron Injectable 4 milliGRAM(s) IV Push every 8 hours  pantoprazole  Injectable 40 milliGRAM(s) IV Push every 12 hours  potassium chloride   Powder 40 milliEquivalent(s) Oral every 4 hours  potassium phosphate / sodium phosphate powder 1 Packet(s) Oral three times a day  sucralfate suspension 1 Gram(s) Oral four times a day  thiamine 100 milliGRAM(s) Oral daily    MEDICATIONS  (PRN):  aluminum hydroxide/magnesium hydroxide/simethicone Suspension 30 milliLiter(s) Oral every 6 hours PRN Dyspepsia  aluminum hydroxide/magnesium hydroxide/simethicone Suspension 30 milliLiter(s) Oral every 6 hours PRN Dyspepsia  LORazepam   Injectable 2 milliGRAM(s) IV Push every 4 hours PRN CIWA-Ar score 8 or greater      __________________________________________________  REVIEW OF SYSTEMS:    CONSTITUTIONAL: No fever, difficulty swallowing   EYES: no acute visual disturbances  NECK: No pain or stiffness  RESPIRATORY: No cough; No shortness of breath  CARDIOVASCULAR: No chest pain, no palpitations  GASTROINTESTINAL: mild pain, + nausea and vomiting; No diarrhea   NEUROLOGICAL: No headache or numbness, no tremors  MUSCULOSKELETAL: No joint pain, no muscle pain  GENITOURINARY: no dysuria, no frequency, no hesitancy  PSYCHIATRY: no depression , no anxiety  ALL OTHER  ROS negative        Vital Signs Last 24 Hrs  T(C): 36.8 (08 Aug 2019 05:25), Max: 37.2 (07 Aug 2019 14:56)  T(F): 98.3 (08 Aug 2019 05:25), Max: 99 (07 Aug 2019 14:56)  HR: 92 (08 Aug 2019 05:25) (90 - 99)  BP: 171/93 (08 Aug 2019 05:25) (161/74 - 188/71)  BP(mean): --  RR: 17 (08 Aug 2019 05:25) (17 - 18)  SpO2: 93% (08 Aug 2019 05:25) (93% - 97%)    ________________________________________________  PHYSICAL EXAM:  GENERAL: NAD  HEENT: Normocephalic;  conjunctivae and sclerae clear; moist mucous membranes;   NECK : supple  CHEST/LUNG: Clear to auscultation bilaterally with good air entry   HEART: S1 S2  regular; no murmurs, gallops or rubs  ABDOMEN: Soft, moderately tender on palpation, Nondistended; Bowel sounds present  EXTREMITIES: no cyanosis; no edema; no calf tenderness  SKIN: warm and dry; no rash  NERVOUS SYSTEM:  Awake and alert; Oriented  to place, person and time ; no new deficits    _________________________________________________  LABS:                        14.2   9.14  )-----------( 216      ( 08 Aug 2019 07:07 )             42.6     08-08    144  |  108  |  18  ----------------------------<  130<H>  3.2<L>   |  30  |  1.10    Ca    8.3<L>      08 Aug 2019 07:07  Phos  2.0     08-08  Mg     2.0     08-08    TPro  6.7  /  Alb  2.4<L>  /  TBili  0.6  /  DBili  0.2  /  AST  14  /  ALT  19  /  AlkPhos  95  08-07        CAPILLARY BLOOD GLUCOSE      POCT Blood Glucose.: 108 mg/dL (08 Aug 2019 08:40)  POCT Blood Glucose.: 168 mg/dL (07 Aug 2019 22:46)  POCT Blood Glucose.: 184 mg/dL (07 Aug 2019 21:15)  POCT Blood Glucose.: 259 mg/dL (07 Aug 2019 16:26)  POCT Blood Glucose.: 183 mg/dL (07 Aug 2019 11:53)        RADIOLOGY & ADDITIONAL TESTS:    Imaging Personally Reviewed:  YES/NO    Consultant(s) Notes Reviewed:   YES/ No    Care Discussed with Consultants :     Plan of care was discussed with patient and /or primary care giver; all questions and concerns were addressed and care was aligned with patient's wishes.

## 2019-08-09 VITALS
SYSTOLIC BLOOD PRESSURE: 160 MMHG | OXYGEN SATURATION: 99 % | TEMPERATURE: 98 F | RESPIRATION RATE: 17 BRPM | HEART RATE: 86 BPM | DIASTOLIC BLOOD PRESSURE: 80 MMHG

## 2019-08-09 LAB
ANION GAP SERPL CALC-SCNC: 4 MMOL/L — LOW (ref 5–17)
ANION GAP SERPL CALC-SCNC: 6 MMOL/L — SIGNIFICANT CHANGE UP (ref 5–17)
BUN SERPL-MCNC: 16 MG/DL — SIGNIFICANT CHANGE UP (ref 7–18)
BUN SERPL-MCNC: 18 MG/DL — SIGNIFICANT CHANGE UP (ref 7–18)
CALCIUM SERPL-MCNC: 8.2 MG/DL — LOW (ref 8.4–10.5)
CALCIUM SERPL-MCNC: 8.3 MG/DL — LOW (ref 8.4–10.5)
CHLORIDE SERPL-SCNC: 104 MMOL/L — SIGNIFICANT CHANGE UP (ref 96–108)
CHLORIDE SERPL-SCNC: 105 MMOL/L — SIGNIFICANT CHANGE UP (ref 96–108)
CO2 SERPL-SCNC: 31 MMOL/L — SIGNIFICANT CHANGE UP (ref 22–31)
CO2 SERPL-SCNC: 32 MMOL/L — HIGH (ref 22–31)
CREAT SERPL-MCNC: 1.03 MG/DL — SIGNIFICANT CHANGE UP (ref 0.5–1.3)
CREAT SERPL-MCNC: 1.04 MG/DL — SIGNIFICANT CHANGE UP (ref 0.5–1.3)
GLUCOSE BLDC GLUCOMTR-MCNC: 101 MG/DL — HIGH (ref 70–99)
GLUCOSE BLDC GLUCOMTR-MCNC: 75 MG/DL — SIGNIFICANT CHANGE UP (ref 70–99)
GLUCOSE BLDC GLUCOMTR-MCNC: 97 MG/DL — SIGNIFICANT CHANGE UP (ref 70–99)
GLUCOSE SERPL-MCNC: 109 MG/DL — HIGH (ref 70–99)
GLUCOSE SERPL-MCNC: 120 MG/DL — HIGH (ref 70–99)
HCT VFR BLD CALC: 40.7 % — SIGNIFICANT CHANGE UP (ref 39–50)
HGB BLD-MCNC: 13.6 G/DL — SIGNIFICANT CHANGE UP (ref 13–17)
MAGNESIUM SERPL-MCNC: 2 MG/DL — SIGNIFICANT CHANGE UP (ref 1.6–2.6)
MCHC RBC-ENTMCNC: 29.2 PG — SIGNIFICANT CHANGE UP (ref 27–34)
MCHC RBC-ENTMCNC: 33.4 GM/DL — SIGNIFICANT CHANGE UP (ref 32–36)
MCV RBC AUTO: 87.5 FL — SIGNIFICANT CHANGE UP (ref 80–100)
NRBC # BLD: 0 /100 WBCS — SIGNIFICANT CHANGE UP (ref 0–0)
PHOSPHATE SERPL-MCNC: 2.6 MG/DL — SIGNIFICANT CHANGE UP (ref 2.5–4.5)
PLATELET # BLD AUTO: 198 K/UL — SIGNIFICANT CHANGE UP (ref 150–400)
POTASSIUM SERPL-MCNC: 3.1 MMOL/L — LOW (ref 3.5–5.3)
POTASSIUM SERPL-MCNC: 3.7 MMOL/L — SIGNIFICANT CHANGE UP (ref 3.5–5.3)
POTASSIUM SERPL-SCNC: 3.1 MMOL/L — LOW (ref 3.5–5.3)
POTASSIUM SERPL-SCNC: 3.7 MMOL/L — SIGNIFICANT CHANGE UP (ref 3.5–5.3)
RBC # BLD: 4.65 M/UL — SIGNIFICANT CHANGE UP (ref 4.2–5.8)
RBC # FLD: 12.1 % — SIGNIFICANT CHANGE UP (ref 10.3–14.5)
SODIUM SERPL-SCNC: 140 MMOL/L — SIGNIFICANT CHANGE UP (ref 135–145)
SODIUM SERPL-SCNC: 142 MMOL/L — SIGNIFICANT CHANGE UP (ref 135–145)
WBC # BLD: 8.85 K/UL — SIGNIFICANT CHANGE UP (ref 3.8–10.5)
WBC # FLD AUTO: 8.85 K/UL — SIGNIFICANT CHANGE UP (ref 3.8–10.5)

## 2019-08-09 PROCEDURE — 82607 VITAMIN B-12: CPT

## 2019-08-09 PROCEDURE — 96375 TX/PRO/DX INJ NEW DRUG ADDON: CPT

## 2019-08-09 PROCEDURE — 82565 ASSAY OF CREATININE: CPT

## 2019-08-09 PROCEDURE — 80076 HEPATIC FUNCTION PANEL: CPT

## 2019-08-09 PROCEDURE — 83935 ASSAY OF URINE OSMOLALITY: CPT

## 2019-08-09 PROCEDURE — 83930 ASSAY OF BLOOD OSMOLALITY: CPT

## 2019-08-09 PROCEDURE — 81001 URINALYSIS AUTO W/SCOPE: CPT

## 2019-08-09 PROCEDURE — 86901 BLOOD TYPING SEROLOGIC RH(D): CPT

## 2019-08-09 PROCEDURE — 84300 ASSAY OF URINE SODIUM: CPT

## 2019-08-09 PROCEDURE — 82570 ASSAY OF URINE CREATININE: CPT

## 2019-08-09 PROCEDURE — 82009 KETONE BODYS QUAL: CPT

## 2019-08-09 PROCEDURE — 83605 ASSAY OF LACTIC ACID: CPT

## 2019-08-09 PROCEDURE — 80307 DRUG TEST PRSMV CHEM ANLYZR: CPT

## 2019-08-09 PROCEDURE — 99285 EMERGENCY DEPT VISIT HI MDM: CPT | Mod: 25

## 2019-08-09 PROCEDURE — 80053 COMPREHEN METABOLIC PANEL: CPT

## 2019-08-09 PROCEDURE — 84443 ASSAY THYROID STIM HORMONE: CPT

## 2019-08-09 PROCEDURE — 86850 RBC ANTIBODY SCREEN: CPT

## 2019-08-09 PROCEDURE — 82803 BLOOD GASES ANY COMBINATION: CPT

## 2019-08-09 PROCEDURE — 86900 BLOOD TYPING SEROLOGIC ABO: CPT

## 2019-08-09 PROCEDURE — 93005 ELECTROCARDIOGRAM TRACING: CPT

## 2019-08-09 PROCEDURE — 76700 US EXAM ABDOM COMPLETE: CPT

## 2019-08-09 PROCEDURE — 84100 ASSAY OF PHOSPHORUS: CPT

## 2019-08-09 PROCEDURE — 83690 ASSAY OF LIPASE: CPT

## 2019-08-09 PROCEDURE — 36415 COLL VENOUS BLD VENIPUNCTURE: CPT

## 2019-08-09 PROCEDURE — 80048 BASIC METABOLIC PNL TOTAL CA: CPT

## 2019-08-09 PROCEDURE — 83036 HEMOGLOBIN GLYCOSYLATED A1C: CPT

## 2019-08-09 PROCEDURE — 80061 LIPID PANEL: CPT

## 2019-08-09 PROCEDURE — 83735 ASSAY OF MAGNESIUM: CPT

## 2019-08-09 PROCEDURE — 82962 GLUCOSE BLOOD TEST: CPT

## 2019-08-09 PROCEDURE — 85027 COMPLETE CBC AUTOMATED: CPT

## 2019-08-09 PROCEDURE — 96374 THER/PROPH/DIAG INJ IV PUSH: CPT

## 2019-08-09 PROCEDURE — 88312 SPECIAL STAINS GROUP 1: CPT

## 2019-08-09 PROCEDURE — 85730 THROMBOPLASTIN TIME PARTIAL: CPT

## 2019-08-09 PROCEDURE — 74174 CTA ABD&PLVS W/CONTRAST: CPT

## 2019-08-09 PROCEDURE — 88305 TISSUE EXAM BY PATHOLOGIST: CPT

## 2019-08-09 PROCEDURE — 85610 PROTHROMBIN TIME: CPT

## 2019-08-09 RX ORDER — LISINOPRIL 2.5 MG/1
1 TABLET ORAL
Qty: 30 | Refills: 0
Start: 2019-08-09 | End: 2019-09-07

## 2019-08-09 RX ORDER — FOLIC ACID 0.8 MG
1 TABLET ORAL
Qty: 30 | Refills: 0
Start: 2019-08-09 | End: 2019-09-07

## 2019-08-09 RX ORDER — POTASSIUM CHLORIDE 20 MEQ
10 PACKET (EA) ORAL
Refills: 0 | Status: COMPLETED | OUTPATIENT
Start: 2019-08-09 | End: 2019-08-09

## 2019-08-09 RX ORDER — LISINOPRIL 2.5 MG/1
1 TABLET ORAL
Qty: 0 | Refills: 0 | DISCHARGE
Start: 2019-08-09

## 2019-08-09 RX ORDER — SUCRALFATE 1 G
10 TABLET ORAL
Qty: 1200 | Refills: 0
Start: 2019-08-09

## 2019-08-09 RX ORDER — SUCRALFATE 1 G
10 TABLET ORAL
Qty: 0 | Refills: 0 | DISCHARGE
Start: 2019-08-09

## 2019-08-09 RX ORDER — PANTOPRAZOLE SODIUM 20 MG/1
1 TABLET, DELAYED RELEASE ORAL
Qty: 30 | Refills: 0
Start: 2019-08-09 | End: 2019-09-07

## 2019-08-09 RX ADMIN — Medication 1 GRAM(S): at 00:22

## 2019-08-09 RX ADMIN — Medication 1 GRAM(S): at 06:03

## 2019-08-09 RX ADMIN — Medication 30 MILLILITER(S): at 10:56

## 2019-08-09 RX ADMIN — ONDANSETRON 4 MILLIGRAM(S): 8 TABLET, FILM COATED ORAL at 06:03

## 2019-08-09 RX ADMIN — Medication 1 TABLET(S): at 11:04

## 2019-08-09 RX ADMIN — Medication 1 GRAM(S): at 11:03

## 2019-08-09 RX ADMIN — BENZOCAINE AND MENTHOL 1 LOZENGE: 5; 1 LIQUID ORAL at 07:37

## 2019-08-09 RX ADMIN — BENZOCAINE AND MENTHOL 1 LOZENGE: 5; 1 LIQUID ORAL at 13:50

## 2019-08-09 RX ADMIN — Medication 100 MILLIEQUIVALENT(S): at 16:54

## 2019-08-09 RX ADMIN — PANTOPRAZOLE SODIUM 40 MILLIGRAM(S): 20 TABLET, DELAYED RELEASE ORAL at 06:03

## 2019-08-09 RX ADMIN — Medication 100 MILLIEQUIVALENT(S): at 10:54

## 2019-08-09 RX ADMIN — Medication 100 MILLIEQUIVALENT(S): at 13:49

## 2019-08-09 RX ADMIN — LISINOPRIL 10 MILLIGRAM(S): 2.5 TABLET ORAL at 06:41

## 2019-08-09 RX ADMIN — Medication 1 MILLIGRAM(S): at 11:04

## 2019-08-09 NOTE — DISCHARGE NOTE PROVIDER - NSDCCPCAREPLAN_GEN_ALL_CORE_FT
PRINCIPAL DISCHARGE DIAGNOSIS  Diagnosis: Hematemesis  Assessment and Plan of Treatment: You came with complaints of blood in vomit generalized abdomianl pain. you were admitted for further management. Your hemoglobin was stable at 16 and we started to montior it. We started you on pantoprazole and medication for nausea. Gastroenterologist was consulted and he did Esophagogastroduodenoscopy which showed us you have severe esophagitis and gastritis. We added carafate to yor medications. The biopsy of your stomach showed you have chronic gastritis and metaplasia of your stomach, no H. pylori infection. You should follow up with gastroenterologist as outpatient for further management and continue to take your pantoprazole and carafate.      SECONDARY DISCHARGE DIAGNOSES  Diagnosis: Hypertension  Assessment and Plan of Treatment: You were found to be hypertensive during your stay in the hospital. we started you on lisinopril. Continue your blood pressure medication. Maintain a healthy diet that consist of low sugar, low fat, low sodium diet. Exercise frequently if possible.  Follow up with primary care physician in one week after discharge.      Diagnosis: Diabetes mellitus  Assessment and Plan of Treatment: Continue with your blood sugar medication. HbAIC was found in admission on 9.5. You must maintain a healthy diet that consist of low sugar, low fat, low sodium diet. Exercise frequently if possible. Consider repeating your Hemoglobin A1c within 3 months after discharge to monitor your average blood glucose control. Follow up with primary care physician in one week after discharge.      Diagnosis: Gallstones without obstruction of gallbladder  Assessment and Plan of Treatment: On your CT scan we saw that you have gallstones, your abdominal pain resolved but you should follow-up with a doctor as outpatient and see if you need  surgery if you ever develope symptoms like fever with colicky abdominal pain. I advise you to avoid fatty food and maintain a healthy diet

## 2019-08-09 NOTE — CHART NOTE - NSCHARTNOTEFT_GEN_A_CORE
Assessment:      Nutrition reassessment for follow-up. Chart reviewed, pt visited, alert, awake, able to communicate well; appetite good PTA prior to sickness this time, denied recent wt changes, denied chewing or swallowing problem at present, no specific food choices reported, requesting regular food in the morning when visited, diet advanced by MD; h/o DM x 11y,     Factors impacting intake: [ ] none [ ] nausea  [ ] vomiting [ ] diarrhea [ ] constipation  [ ]chewing problems [ ] swallowing issues  [ X ] other: acute on chronic comorbidities     Diet Presciption: Diet, Regular:   Consistent Carbohydrate {Evening Snacks}  DASH/TLC {Sodium & Cholesterol Restricted}  Royce (19 @ 09:30)    Intake: tolerating liquid diet, diet advanced to solid meal today     Daily Weight in k.3 (09 Aug 2019 05:05)  Weight in k.6 (08 Aug 2019 05:25)  Weight in k.1 (07 Aug 2019 13:11)  Weight in k.8 (07 Aug 2019 05:05)  Weight in k.9 (06 Aug 2019 05:18)  Weight in k.7 (05 Aug 2019 21:27)    % Weight Change: a bit fluctuated, may due to scale variance     Pertinent Medications: MEDICATIONS  (STANDING):  benzocaine 15 mG/menthol 3.6 mG (Sugar-Free) Lozenge 1 Lozenge Oral three times a day  folic acid 1 milliGRAM(s) Oral daily  insulin glargine Injectable (LANTUS) 24 Unit(s) SubCutaneous at bedtime  insulin lispro (HumaLOG) corrective regimen sliding scale   SubCutaneous Before meals and at bedtime  lisinopril 10 milliGRAM(s) Oral daily  multivitamin 1 Tablet(s) Oral daily  ondansetron Injectable 4 milliGRAM(s) IV Push every 8 hours  pantoprazole  Injectable 40 milliGRAM(s) IV Push every 12 hours  potassium chloride  10 mEq/100 mL IVPB 10 milliEquivalent(s) IV Intermittent every 1 hour  sucralfate suspension 1 Gram(s) Oral four times a day    MEDICATIONS  (PRN):  aluminum hydroxide/magnesium hydroxide/simethicone Suspension 30 milliLiter(s) Oral every 6 hours PRN Dyspepsia  aluminum hydroxide/magnesium hydroxide/simethicone Suspension 30 milliLiter(s) Oral every 6 hours PRN Dyspepsia  LORazepam   Injectable 2 milliGRAM(s) IV Push every 4 hours PRN CIWA-Ar score 8 or greater    Pertinent Labs:  Na142 mmol/L Glu 120 mg/dL<H> K+ 3.1 mmol/L<L> Cr  1.03 mg/dL BUN 18 mg/dL  Phos 2.6 mg/dL  Alb 2.4 g/dL<L>  [id number] 9.5 %<H>  Chol 183 mg/dL  mg/dL HDL 63 mg/dL Trig 100 mg/dL     CAPILLARY BLOOD GLUCOSE      POCT Blood Glucose.: 75 mg/dL (09 Aug 2019 11:49)  POCT Blood Glucose.: 97 mg/dL (09 Aug 2019 08:02)  POCT Blood Glucose.: 145 mg/dL (08 Aug 2019 21:41)  POCT Blood Glucose.: 149 mg/dL (08 Aug 2019 16:55)    Skin: intact     Estimated Needs:   [  X ] no change since previous assessment  [ ] recalculated:     Previous Nutrition Diagnosis:   [ X ]Inadequate Oral Intake      Nutrition Diagnosis is [ ] ongoing  [ X ] Improving   [ ] resolved [ ] not applicable     New Nutrition Diagnosis: [ ] not applicable       Interventions:   Recommend  [ ] Change Diet To:  [ ] Nutrition Supplement  [ ] Nutrition Support  [ ] Other:     Monitoring and Evaluation:   [ ] PO intake [ x ] Tolerance to diet prescription [ x ] weights [ x ] labs[ x ] follow up per protocol  [ ] other: Assessment:      Nutrition reassessment for follow-up. Chart reviewed, pt visited, alert, awake, able to communicate well; appetite good PTA prior to sickness this time, denied recent wt changes, denied chewing or swallowing problem at present, no specific food choices reported, requesting regular food in the morning when visited, diet advanced by MD; per pt, h/o DM x 11y, knows DM diet, refusing more nutrition information/education at present     Factors impacting intake: [ ] none [ ] nausea  [ ] vomiting [ ] diarrhea [ ] constipation  [ ]chewing problems [ ] swallowing issues  [ X ] other: acute on chronic comorbidities     Diet Presciption: Diet, Regular:   Consistent Carbohydrate {Evening Snacks}  DASH/TLC {Sodium & Cholesterol Restricted}  Royceher (19 @ 09:30)    Intake: tolerating liquid diet, diet advanced to solid meal today     Daily Weight in k.3 (09 Aug 2019 05:05)  Weight in k.6 (08 Aug 2019 05:25)  Weight in k.1 (07 Aug 2019 13:11)  Weight in k.8 (07 Aug 2019 05:05)  Weight in k.9 (06 Aug 2019 05:18)  Weight in k.7 (05 Aug 2019 21:27)    % Weight Change: a bit fluctuated, may due to scale variance     Pertinent Medications: MEDICATIONS  (STANDING):  benzocaine 15 mG/menthol 3.6 mG (Sugar-Free) Lozenge 1 Lozenge Oral three times a day  folic acid 1 milliGRAM(s) Oral daily  insulin glargine Injectable (LANTUS) 24 Unit(s) SubCutaneous at bedtime  insulin lispro (HumaLOG) corrective regimen sliding scale   SubCutaneous Before meals and at bedtime  lisinopril 10 milliGRAM(s) Oral daily  multivitamin 1 Tablet(s) Oral daily  ondansetron Injectable 4 milliGRAM(s) IV Push every 8 hours  pantoprazole  Injectable 40 milliGRAM(s) IV Push every 12 hours  potassium chloride  10 mEq/100 mL IVPB 10 milliEquivalent(s) IV Intermittent every 1 hour  sucralfate suspension 1 Gram(s) Oral four times a day    MEDICATIONS  (PRN):  aluminum hydroxide/magnesium hydroxide/simethicone Suspension 30 milliLiter(s) Oral every 6 hours PRN Dyspepsia  aluminum hydroxide/magnesium hydroxide/simethicone Suspension 30 milliLiter(s) Oral every 6 hours PRN Dyspepsia  LORazepam   Injectable 2 milliGRAM(s) IV Push every 4 hours PRN CIWA-Ar score 8 or greater    Pertinent Labs:  Na142 mmol/L Glu 120 mg/dL<H> K+ 3.1 mmol/L<L> Cr  1.03 mg/dL BUN 18 mg/dL  Phos 2.6 mg/dL  Alb 2.4 g/dL<L>  [id number] 9.5 %<H>  Chol 183 mg/dL  mg/dL HDL 63 mg/dL Trig 100 mg/dL     CAPILLARY BLOOD GLUCOSE      POCT Blood Glucose.: 75 mg/dL (09 Aug 2019 11:49)  POCT Blood Glucose.: 97 mg/dL (09 Aug 2019 08:02)  POCT Blood Glucose.: 145 mg/dL (08 Aug 2019 21:41)  POCT Blood Glucose.: 149 mg/dL (08 Aug 2019 16:55)    Skin: intact     Estimated Needs:   [  X ] no change since previous assessment  [ ] recalculated:     Previous Nutrition Diagnosis:   [ X ]Inadequate Oral Intake      Nutrition Diagnosis is [ ] ongoing  [ X ] Improving   [ ] resolved [ ] not applicable     New Nutrition Diagnosis: [ ] not applicable       Interventions:   Recommend  [ X ] Change Diet To: diet advanced by MD today, continue diet Rx as ordered   [ ] Nutrition Supplement  [ ] Nutrition Support  [ X ] Other:  Provide food choices within diet Rx as available/updated; Discussed with MD; Diet reinforcement as feasible when pt receptive     Monitoring and Evaluation:   [ X ] PO intake [ x ] Tolerance to diet prescription [ x ] weights [ x ] labs[ x ] follow up per protocol  [ ] other: Assessment:      Nutrition reassessment for follow-up. Chart reviewed, pt visited, alert, awake, able to communicate well; appetite good PTA prior to sickness this time, denied recent wt changes, denied chewing or swallowing problem at present, no specific food choices reported, requesting regular food in the morning when visited, diet advanced by MD; per pt, h/o DM x 11y, knows DM diet, refusing more nutrition information/education at present     Factors impacting intake: [ ] none [ ] nausea  [ ] vomiting [ ] diarrhea [ ] constipation  [ ]chewing problems [ ] swallowing issues  [ X ] other: acute on chronic comorbidities     Diet Presciption: Diet, Regular:   Consistent Carbohydrate {Evening Snacks}  DASH/TLC {Sodium & Cholesterol Restricted}  Kosher (19 @ 09:30)    Intake: NPO/Liquid diet x 4 to 5d, tolerating liquid diet, diet advanced to solid meal today     Daily Weight in k.3 (09 Aug 2019 05:05)  Weight in k.6 (08 Aug 2019 05:25)  Weight in k.1 (07 Aug 2019 13:11)  Weight in k.8 (07 Aug 2019 05:05)  Weight in k.9 (06 Aug 2019 05:18)  Weight in k.7 (05 Aug 2019 21:27)    % Weight Change: a bit fluctuated, may due to scale variance     Pertinent Medications: MEDICATIONS  (STANDING):  benzocaine 15 mG/menthol 3.6 mG (Sugar-Free) Lozenge 1 Lozenge Oral three times a day  folic acid 1 milliGRAM(s) Oral daily  insulin glargine Injectable (LANTUS) 24 Unit(s) SubCutaneous at bedtime  insulin lispro (HumaLOG) corrective regimen sliding scale   SubCutaneous Before meals and at bedtime  lisinopril 10 milliGRAM(s) Oral daily  multivitamin 1 Tablet(s) Oral daily  ondansetron Injectable 4 milliGRAM(s) IV Push every 8 hours  pantoprazole  Injectable 40 milliGRAM(s) IV Push every 12 hours  potassium chloride  10 mEq/100 mL IVPB 10 milliEquivalent(s) IV Intermittent every 1 hour  sucralfate suspension 1 Gram(s) Oral four times a day    MEDICATIONS  (PRN):  aluminum hydroxide/magnesium hydroxide/simethicone Suspension 30 milliLiter(s) Oral every 6 hours PRN Dyspepsia  aluminum hydroxide/magnesium hydroxide/simethicone Suspension 30 milliLiter(s) Oral every 6 hours PRN Dyspepsia  LORazepam   Injectable 2 milliGRAM(s) IV Push every 4 hours PRN CIWA-Ar score 8 or greater    Pertinent Labs:  Na142 mmol/L Glu 120 mg/dL<H> K+ 3.1 mmol/L<L> Cr  1.03 mg/dL BUN 18 mg/dL  Phos 2.6 mg/dL  Alb 2.4 g/dL<L>  [id number] 9.5 %<H>  Chol 183 mg/dL  mg/dL HDL 63 mg/dL Trig 100 mg/dL     CAPILLARY BLOOD GLUCOSE      POCT Blood Glucose.: 75 mg/dL (09 Aug 2019 11:49)  POCT Blood Glucose.: 97 mg/dL (09 Aug 2019 08:02)  POCT Blood Glucose.: 145 mg/dL (08 Aug 2019 21:41)  POCT Blood Glucose.: 149 mg/dL (08 Aug 2019 16:55)    Skin: intact     Estimated Needs:   [  X ] no change since previous assessment  [ ] recalculated:     Previous Nutrition Diagnosis:   [ X ]Inadequate Oral Intake      Nutrition Diagnosis is [ ] ongoing  [ X ] Improving   [ ] resolved [ ] not applicable     New Nutrition Diagnosis: [ ] not applicable       Interventions:   Recommend  [ X ] Change Diet To: diet advanced by MD today, continue diet Rx as ordered   [ ] Nutrition Supplement  [ ] Nutrition Support  [ X ] Other:  Provide food choices within diet Rx as available/updated; Discussed with MD; Diet reinforcement as feasible when pt receptive     Monitoring and Evaluation:   [ X ] PO intake [ x ] Tolerance to diet prescription [ x ] weights [ x ] labs[ x ] follow up per protocol  [ ] other:

## 2019-08-09 NOTE — DISCHARGE NOTE NURSING/CASE MANAGEMENT/SOCIAL WORK - NSDCPEEMAIL_GEN_ALL_CORE
[facility] for Tobacco Control email [email or web address]

## 2019-08-09 NOTE — DISCHARGE NOTE NURSING/CASE MANAGEMENT/SOCIAL WORK - NSDCDPATPORTLINK_GEN_ALL_CORE
You can access the [email or web address] Patient Portal, offered by [facility], by registering with the following website: [email or web address]

## 2019-08-09 NOTE — DISCHARGE NOTE NURSING/CASE MANAGEMENT/SOCIAL WORK - NSDCPEWEB_GEN_ALL_CORE
[place] website --- [email or web address] for Tobacco Control website --- [email or web address]

## 2019-08-09 NOTE — PROGRESS NOTE ADULT - ASSESSMENT
53 year old dirk garcia abdominal pain:    Plan:  Dyspepsia  Severe esophagiutis on EGD   PPi and Carafate   Avoid ETOH     Gallstone:  His pain does not appear to be biliary in nature however the stone is not insignificant in size.   Surgery should be consulted inpatient versus outpatient     Advanced care planning was discussed with patient and family.  Advanced care planning forms were reviewed and discussed.  Risks, benefits and alternatives of gastroenterologic procedures were discussed in detail and all questions were answered.

## 2019-08-09 NOTE — PROGRESS NOTE ADULT - REASON FOR ADMISSION
abdominal pain nausea vomiting and hematemesis

## 2019-08-09 NOTE — DISCHARGE NOTE PROVIDER - HOSPITAL COURSE
Jose J Cash is a 54 yo M from home with PMHx of diabetes (on insulin), alcoholism (1-2 drinks daily for past 2 months) who presented to ED c/o blood in vomitus, along with generalized abdominal pain which is intermittent and associated with nausea and multiple eps of vomiting. Pt was started on IV protonix BID and IV zofran, a clear liquid diet, with close monitoring of hemodynamic status and CIWA score. CT abdomen revealed cholelithiases and WBC count of 15K. Pt also presented with hyperglycemia. Pt was started on LANTUS 20 U and insulin HSS, A1c level is 9.5.  Pt's CIWA score has not indicated need for ativan for the past 48 hours. Pt started complaining of throat pain and inability to swallow full pills. An EGD was  performed by GI on 08/07, which showed 1- Severe LA Grade D esophagitis 2- Mild gastritis. Pt is on full liquid diet and will advance as tolerated. we started him on sucralfate suspension q4 (as per GI),    Pt was noted to have high blood pressure and we started him on lisinopril 5mg which was increased to 10mg to control his blood pressure.     Pt is stable and able to tolerate regular diets and ready for discharge.

## 2019-08-09 NOTE — DISCHARGE NOTE PROVIDER - CARE PROVIDER_API CALL
Favio Chaudhry)  Gastroenterology; Internal Medicine  [address] [place] [address]  Phone: [phone]  Fax: [phone]  Follow Up Time:

## 2021-02-26 NOTE — PROGRESS NOTE ADULT - PROVIDER SPECIALTY LIST ADULT
Gastroenterology
Gastroenterology
Internal Medicine
PRINCIPAL PROCEDURE  Procedure: Left heart catheterization  Findings and Treatment: Normal coronaries

## 2021-05-18 NOTE — DISCHARGE NOTE NURSING/CASE MANAGEMENT/SOCIAL WORK - NSTOBACCONEVERSMOKERY/N_GEN_A
Yes PROBLEM DIAGNOSES  Problem: Malignant neoplasm of tonsil  Assessment and Plan: Pt scheduled for radal resection tonsillectomy with closure with flap pharyngectomy limitted partial glossectomy robotic procedure left neck dissection on 5/26/2021.  labs done results pending, Pt scheduled for preop covid testing.  Preop teaching done, pt able to verbalize understanding.   meds day of surgery pepcid

## 2021-09-23 NOTE — PATIENT PROFILE ADULT - [id number]
Group Therapy Note    Date: 9/23/2021  Start Time: 0830  End Time:  0945  Number of Participants: 6    Type of Group: Psychotherapy      Notes:  Pt is new to the group Pt introduced himself to the group. He shares a history of addiction and is approaching 6 months of sobriety. Pt is actively involved in [place] [id number] and has a Sponsor. Pt shares life stressors which he struggles to cope with, one of which is his divorce from his wife of 39 years. Pt interacts well with the other group members and offers support. Status After Intervention:  Improved    Participation Level:  Active Listener and Interactive    Participation Quality: Appropriate, Attentive, Sharing and Supportive      Speech:  normal      Thought Process/Content: Logical  Linear      Affective Functioning: Congruent      Mood: anxious      Level of consciousness:  Alert, Oriented x4 and Attentive      Response to Learning: Able to verbalize current knowledge/experience, Able to verbalize/acknowledge new learning, Able to retain information, Capable of insight, Able to change behavior and Progressing to goal      Endings: None Reported    Modes of Intervention: Education, Support, Socialization, Exploration, Clarifying and Problem-solving      Discipline Responsible: /Counselor      Signature:  TING Bowser
Group Therapy Note    Date: 9/23/2021  Start Time: 1000  End Time:  1100  Number of Participants: 6    Type of Group: Cognitive skills group      Notes:  Pt is present for group as the group explored self defeating thoughts and beliefs. Discussed the phrase, The Attitude is the father of the action. Pt explored thoughts and experiences which contribute to emotional distress. Also explored, are the self perceived stigma's of mental health and disorders of addiction, as compared to other medical conditions. Pt voiced his efforts to be aware of his own thinking as that tends to be the source of his emotional distress. Status After Intervention:  Improved    Participation Level:  Active Listener and Interactive    Participation Quality: Appropriate, Attentive, Sharing and Supportive      Speech:  normal      Thought Process/Content: Logical  Linear      Affective Functioning: Congruent      Mood: Dysphoric      Level of consciousness:  Alert, Oriented x4 and Attentive      Response to Learning: Able to verbalize current knowledge/experience, Able to verbalize/acknowledge new learning, Able to retain information, Capable of insight, Able to change behavior and Progressing to goal      Endings: None Reported    Modes of Intervention: Education, Support, Socialization, Exploration, Clarifying and Problem-solving      Discipline Responsible: /Counselor      Signature:  TING Chanel
Group Therapy Note    Date: 9/23/2021  Start Time: 1115  End Time:  1200  Number of Participants: 6    Type of Group: Recovery skills      Notes:  Pt is present for group as the group explored issues which support recovery. Discussed and identified support systems. Peers shared the benefit which they feel by sharing with other people who share common experience. Pt also discussed how this is helpful in letting go of feelings of shame and helping to move to improved levels of acceptance. Pt shared that he will be leaving today and going to an [address] Status After Intervention:  Improved    Participation Level:  Active Listener and Interactive    Participation Quality: Appropriate, Attentive, Sharing and Supportive      Speech:  normal      Thought Process/Content: Logical  Linear      Affective Functioning: Congruent      Euthymic      Level of consciousness:  Alert, Oriented x4 and Attentive      Response to Learning: Able to verbalize current knowledge/experience, Able to verbalize/acknowledge new learning, Able to retain information, Capable of insight, Able to change behavior and Progressing to goal      Endings: None Reported    Modes of Intervention: Education, Support, Socialization, Exploration, Clarifying and Problem-solving      Discipline Responsible: /Counselor      Signature:  TING Cancino
Smoking even a single puff increases the likelihood of a full relapse, withdrawal symptoms peak within 1-2 weeks, but can persist for months
